# Patient Record
Sex: MALE | Race: WHITE | Employment: OTHER | URBAN - METROPOLITAN AREA
[De-identification: names, ages, dates, MRNs, and addresses within clinical notes are randomized per-mention and may not be internally consistent; named-entity substitution may affect disease eponyms.]

---

## 2018-01-09 PROBLEM — M25.562 ACUTE PAIN OF LEFT KNEE: Status: ACTIVE | Noted: 2018-01-09

## 2019-05-01 ENCOUNTER — HOSPITAL ENCOUNTER (EMERGENCY)
Age: 69
Discharge: HOME | End: 2019-05-01
Payer: MEDICARE

## 2019-05-01 ENCOUNTER — APPOINTMENT (OUTPATIENT)
Dept: ULTRASOUND IMAGING | Age: 69
End: 2019-05-01
Payer: MEDICARE

## 2019-05-01 ENCOUNTER — APPOINTMENT (OUTPATIENT)
Dept: GENERAL RADIOLOGY | Age: 69
End: 2019-05-01
Payer: MEDICARE

## 2019-05-01 VITALS
TEMPERATURE: 98.9 F | HEART RATE: 58 BPM | OXYGEN SATURATION: 97 % | RESPIRATION RATE: 18 BRPM | WEIGHT: 205 LBS | BODY MASS INDEX: 28.7 KG/M2 | DIASTOLIC BLOOD PRESSURE: 69 MMHG | HEIGHT: 71 IN | SYSTOLIC BLOOD PRESSURE: 156 MMHG

## 2019-05-01 DIAGNOSIS — M79.604 RIGHT LEG PAIN: ICD-10-CM

## 2019-05-01 DIAGNOSIS — M25.561 ACUTE PAIN OF RIGHT KNEE: Primary | ICD-10-CM

## 2019-05-01 PROCEDURE — 93971 EXTREMITY STUDY: CPT

## 2019-05-01 PROCEDURE — 73564 X-RAY EXAM KNEE 4 OR MORE: CPT

## 2019-05-01 PROCEDURE — 6370000000 HC RX 637 (ALT 250 FOR IP): Performed by: PHYSICIAN ASSISTANT

## 2019-05-01 PROCEDURE — 99283 EMERGENCY DEPT VISIT LOW MDM: CPT

## 2019-05-01 RX ORDER — HYDROCODONE BITARTRATE AND ACETAMINOPHEN 5; 325 MG/1; MG/1
1 TABLET ORAL ONCE
Status: COMPLETED | OUTPATIENT
Start: 2019-05-01 | End: 2019-05-01

## 2019-05-01 RX ORDER — HYDROCODONE BITARTRATE AND ACETAMINOPHEN 5; 325 MG/1; MG/1
1 TABLET ORAL EVERY 6 HOURS PRN
Qty: 12 TABLET | Refills: 0 | Status: SHIPPED | OUTPATIENT
Start: 2019-05-01 | End: 2019-05-04

## 2019-05-01 RX ADMIN — HYDROCODONE BITARTRATE AND ACETAMINOPHEN 1 TABLET: 5; 325 TABLET ORAL at 17:57

## 2019-05-01 ASSESSMENT — PAIN SCALES - GENERAL
PAINLEVEL_OUTOF10: 10
PAINLEVEL_OUTOF10: 6

## 2019-05-01 NOTE — ED PROVIDER NOTES
Independent MLP    HPI:  5/1/19,   Time: 5:09 PM         Matt Ann is a 70 y.o. male presenting to the ED for right knee and leg pain, beginning a couple of weeks ago. The complaint has been constant, moderate in severity, and worsened by movement of knee, walking. She states he's having some right knee pain for the last couple weeks. It feels like an ache. The pain is mostly to the lateral side B also has it on the lateral side of the leg and upper thigh. He said has some tenderness behind the knee. He has an appointment with Dr. Vikki Cruz on Tuesday. Today while getting out of his car he hyperextended the leg and now having worsening pain unable to bear weight. Patient works and is having difficulty doing his job as he does do a lot of lifting  Because of the pain. Denies any head injury or neck pain. ROS:   Pertinent positives and negatives are stated within HPI, all other systems reviewed and are negative.  --------------------------------------------- PAST HISTORY ---------------------------------------------  Past Medical History:  has a past medical history of Hoarseness. Past Surgical History:  has a past surgical history that includes Appendectomy and laryngoscopy (10/18/2012). Social History:  reports that he has been smoking cigars. He has never used smokeless tobacco. He reports that he drinks alcohol. He reports that he does not use drugs. Family History: family history is not on file. The patients home medications have been reviewed. Allergies: Patient has no known allergies. -------------------------------------------------- RESULTS -------------------------------------------------  All laboratory and radiology results have been personally reviewed by myself   LABS:  No results found for this visit on 05/01/19. RADIOLOGY:  Interpreted by Radiologist.  XR KNEE RIGHT (MIN 4 VIEWS)   Final Result   Mild osteoarthritis as described.       US DUP LOWER EXTREMITY RIGHT SARAH   Final Result   PATENT DEEP VENOUS SYSTEM OF THE RIGHT LOWER EXTREMITY. NO EVIDENCE FOR DVT.          ------------------------- NURSING NOTES AND VITALS REVIEWED ---------------------------   The nursing notes within the ED encounter and vital signs as below have been reviewed. BP (!) 156/69   Pulse 58   Temp 98.9 °F (37.2 °C) (Oral)   Resp 18   Ht 5' 11\" (1.803 m)   Wt 205 lb (93 kg)   SpO2 97%   BMI 28.59 kg/m²   Oxygen Saturation Interpretation: Normal      ---------------------------------------------------PHYSICAL EXAM--------------------------------------      Constitutional/General: Alert and oriented x3, well appearing, non toxic in NAD  Head: NC/AT  Eyes: PERRL, EOMI  Mouth: Oropharynx clear, handling secretions, no trismus  Neck: Supple, full ROM, no meningeal signs  Pulmonary: Lungs clear to auscultation bilaterally, no wheezes, rales, or rhonchi. Not in respiratory distress  Cardiovascular:  Regular rate and rhythm, no murmurs, gallops, or rubs. 2+ distal pulses  Abdomen: Soft, non tender, non distended,   Extremities: Moves all extremities x 4. Warm and well perfused. Tenderness palpation of the right lateral leg, posterior knee and right lateral thigh. Negative edema, ecchymosis or erythema. Distal pulses are intact. Range of motion the right knee is intact but painful. Negative ligament laxity is appreciated On the right knee  Skin: warm and dry without rash  Neurologic: GCS 15,  Psych: Normal Affect      ------------------------------ ED COURSE/MEDICAL DECISION MAKING----------------------  Medications - No data to display      Medical Decision Making:    Patient was seen independently. Results reviewed with the patient. I did offer patient an immobilizer he has refused that he does have crutches in the car. Has appointment with his orthopedic doctor on Tuesday. Once I discussed   For any worsening symptoms. Counseling:    The emergency provider has spoken with the patient and discussed todays results, in addition to providing specific details for the plan of care and counseling regarding the diagnosis and prognosis. Questions are answered at this time and they are agreeable with the plan.      --------------------------------- IMPRESSION AND DISPOSITION ---------------------------------    IMPRESSION  1. Acute pain of right knee New Problem   2.  Right leg pain New Problem       DISPOSITION  Disposition: Discharge to home  Patient condition is stable                 Yue Mcgee [place]  05/01/19 6433

## 2019-05-07 ENCOUNTER — OFFICE VISIT (OUTPATIENT)
Dept: ORTHOPEDIC SURGERY | Age: 69
End: 2019-05-07
Payer: MEDICARE

## 2019-05-07 VITALS
HEART RATE: 48 BPM | HEIGHT: 70 IN | BODY MASS INDEX: 29.35 KG/M2 | WEIGHT: 205 LBS | SYSTOLIC BLOOD PRESSURE: 156 MMHG | TEMPERATURE: 98.2 F | DIASTOLIC BLOOD PRESSURE: 62 MMHG

## 2019-05-07 DIAGNOSIS — G89.29 CHRONIC PAIN OF RIGHT KNEE: Primary | ICD-10-CM

## 2019-05-07 DIAGNOSIS — M25.561 CHRONIC PAIN OF RIGHT KNEE: Primary | ICD-10-CM

## 2019-05-07 DIAGNOSIS — M17.12 OSTEOARTHRITIS OF LEFT KNEE, UNSPECIFIED OSTEOARTHRITIS TYPE: ICD-10-CM

## 2019-05-07 PROCEDURE — G8419 CALC BMI OUT NRM PARAM NOF/U: HCPCS | Performed by: ORTHOPAEDIC SURGERY

## 2019-05-07 PROCEDURE — 4004F PT TOBACCO SCREEN RCVD TLK: CPT | Performed by: ORTHOPAEDIC SURGERY

## 2019-05-07 PROCEDURE — 20610 DRAIN/INJ JOINT/BURSA W/O US: CPT | Performed by: ORTHOPAEDIC SURGERY

## 2019-05-07 PROCEDURE — 1123F ACP DISCUSS/DSCN MKR DOCD: CPT | Performed by: ORTHOPAEDIC SURGERY

## 2019-05-07 PROCEDURE — G8427 DOCREV CUR MEDS BY ELIG CLIN: HCPCS | Performed by: ORTHOPAEDIC SURGERY

## 2019-05-07 PROCEDURE — 3017F COLORECTAL CA SCREEN DOC REV: CPT | Performed by: ORTHOPAEDIC SURGERY

## 2019-05-07 PROCEDURE — 4040F PNEUMOC VAC/ADMIN/RCVD: CPT | Performed by: ORTHOPAEDIC SURGERY

## 2019-05-07 PROCEDURE — 99213 OFFICE O/P EST LOW 20 MIN: CPT | Performed by: ORTHOPAEDIC SURGERY

## 2019-05-07 RX ORDER — TRIAMCINOLONE ACETONIDE 40 MG/ML
80 INJECTION, SUSPENSION INTRA-ARTICULAR; INTRAMUSCULAR ONCE
Status: COMPLETED | OUTPATIENT
Start: 2019-05-07 | End: 2019-05-07

## 2019-05-07 RX ORDER — LIDOCAINE HYDROCHLORIDE 10 MG/ML
3 INJECTION, SOLUTION INFILTRATION; PERINEURAL ONCE
Status: COMPLETED | OUTPATIENT
Start: 2019-05-07 | End: 2019-05-07

## 2019-05-07 RX ADMIN — LIDOCAINE HYDROCHLORIDE 3 ML: 10 INJECTION, SOLUTION INFILTRATION; PERINEURAL at 15:43

## 2019-05-07 RX ADMIN — TRIAMCINOLONE ACETONIDE 80 MG: 40 INJECTION, SUSPENSION INTRA-ARTICULAR; INTRAMUSCULAR at 15:44

## 2019-05-08 NOTE — PROGRESS NOTES
Chief Complaint:   Chief Complaint   Patient presents with    Knee Pain     Right \"throbbing\" knee pain x 6 - 8 wks. This past wednesday (5/1/19) he hyper-extended right knee, he is having diffuculty putting pressure on it. Pt. is using one crutch, especially on stairs. Pt. went to [facility] ED and xrays were taken. Linus Mehta  presents with a 6-8 week history of throbbing pain mostly medial aspect of the right knee occurring with activities involving weightbearing standing and flexion. No history of injury, although last week he did have what sounds like a ground-level hyperextension mechanism in pain became acutely worse at that time, interfering more significantly with weightbearing activities. No mechanical locking giving way, denies previous problems with this knee, he does have a history of similar issues with the left knee for which I saw him a year ago, the symptoms are mild at this point and had responded to conservative means. Taking over-the-counter's with minimal relief, no fever chills sweats other systemic symptoms nor other joint complaints. Allergies; medications; past medical, surgical, family, and social history; and problem list have been reviewed today and updated as indicated in this encounter seen below. Exam: Upper extremities intact leg lengths equal hip motion painless. Left knee tender to palpation medial joint line normal range of motion no effusion or laxity. Right knee shows some synovitis no effusion, exquisite tenderness to palpation around the posterior medial joint line but negative Michael's. No mediolateral or AP laxity. Range of motion 0 to 1:30 bilaterally. Radiographs: Weightbearing x-rays of the knees were obtained today, there is some medial narrowing bilaterally with mild varus deformities early subchondral sclerosis and osteophyte formation consistent with early to moderate DJD bilateral knees. Ger Staples was seen today for knee pain.     Diagnoses and all orders for this visit:    Chronic pain of right knee  -     TX ARTHROCENTESIS ASPIR&/INJ MAJOR JT/BURSA W/O US    Osteoarthritis of left knee, unspecified osteoarthritis type  -     XR Knee Bilateral Standing  -     Cancel: XR KNEE LEFT (1-2 VIEWS)  -     XR KNEE RIGHT (1-2 VIEWS)    Other orders  -     lidocaine 1 % injection 3 mL  -     triamcinolone acetonide (KENALOG-40) injection 80 mg       Treatment options were reviewed, nonoperative treatment is advised at this point. Patient was reminded to pursue the physical therapy exercises as he learned on the left, at his request I injected the right knee today with Kenalog and lidocaine no difficulty, or complication tolerated well. Questions asked and answered follow-up in 6 weeks repeat exam further discussion. Return in about 6 weeks (around 6/18/2019). Current Outpatient Medications   Medication Sig Dispense Refill    amLODIPine (NORVASC) 5 MG tablet Take 5 mg by mouth daily      ibuprofen (ADVIL;MOTRIN) 200 MG tablet Take 600 mg by mouth every morning. No current facility-administered medications for this visit.         Patient Active Problem List   Diagnosis    Acute pain of left knee       Past Medical History:   Diagnosis Date    Hoarseness     began 8/2012; no pain       Past Surgical History:   Procedure Laterality Date    APPENDECTOMY      LARYNGOSCOPY  10/18/2012       No Known Allergies    Social History     Socioeconomic History    Marital status:      Spouse name: None    Number of children: None    Years of education: None    Highest education level: None   Occupational History    None   Social Needs    Financial resource strain: None    Food insecurity:     Worry: None     Inability: None    Transportation needs:     Medical: None     Non-medical: None   Tobacco Use    Smoking status: Current Every Day Smoker     Types: Cigars    Smokeless tobacco: Never Used    Tobacco comment: 3 to 4 cigars daily   Substance

## 2019-06-13 ENCOUNTER — OFFICE VISIT (OUTPATIENT)
Dept: ORTHOPEDIC SURGERY | Age: 69
End: 2019-06-13
Payer: MEDICARE

## 2019-06-13 VITALS
DIASTOLIC BLOOD PRESSURE: 62 MMHG | TEMPERATURE: 98.5 F | HEIGHT: 70 IN | WEIGHT: 205 LBS | HEART RATE: 53 BPM | BODY MASS INDEX: 29.35 KG/M2 | SYSTOLIC BLOOD PRESSURE: 157 MMHG

## 2019-06-13 DIAGNOSIS — M48.062 SPINAL STENOSIS OF LUMBAR REGION WITH NEUROGENIC CLAUDICATION: Primary | ICD-10-CM

## 2019-06-13 DIAGNOSIS — M25.561 CHRONIC PAIN OF RIGHT KNEE: ICD-10-CM

## 2019-06-13 DIAGNOSIS — G89.29 CHRONIC PAIN OF RIGHT KNEE: ICD-10-CM

## 2019-06-13 PROCEDURE — G8427 DOCREV CUR MEDS BY ELIG CLIN: HCPCS | Performed by: ORTHOPAEDIC SURGERY

## 2019-06-13 PROCEDURE — 3017F COLORECTAL CA SCREEN DOC REV: CPT | Performed by: ORTHOPAEDIC SURGERY

## 2019-06-13 PROCEDURE — 1123F ACP DISCUSS/DSCN MKR DOCD: CPT | Performed by: ORTHOPAEDIC SURGERY

## 2019-06-13 PROCEDURE — 99213 OFFICE O/P EST LOW 20 MIN: CPT | Performed by: ORTHOPAEDIC SURGERY

## 2019-06-13 PROCEDURE — 4004F PT TOBACCO SCREEN RCVD TLK: CPT | Performed by: ORTHOPAEDIC SURGERY

## 2019-06-13 PROCEDURE — G8419 CALC BMI OUT NRM PARAM NOF/U: HCPCS | Performed by: ORTHOPAEDIC SURGERY

## 2019-06-13 PROCEDURE — 4040F PNEUMOC VAC/ADMIN/RCVD: CPT | Performed by: ORTHOPAEDIC SURGERY

## 2019-06-13 NOTE — PROGRESS NOTES
Chief Complaint:   Chief Complaint   Patient presents with    Knee Pain     Pt. states steroid injection really helped R knee pain, today has slight medial R knee pain 1/10. Still has cramping back of R thigh and calf area. Maira Lopez reports dramatic and persistent relief of his focal right knee pain following the steroid injection although he does note persistence of the radicular type aching and cramping in the posterior aspect of the thigh and calf that actually predated his knee pain. Taking nothing in these regards for pain, symptoms in the back of the leg are interfering with his activities including transfers ambulation and stair climbing. No numbness tingling in the foot, no loss of bowel or bladder control and no other joint complaints. 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 Allergies; medications; past medical, surgical, family, and social history; and problem list have been reviewed today and updated as indicated in this encounter seen below. Exam: Leg lengths equal hip motion painless, no objective motor or sensory deficits. Right knee shows no laxity deformity or effusion no crepitus with full range of motion. Straight leg raising notable only for some tension in the hamstrings today. Radiographs: Deferred today previous radiographs showing early DJD of the right knee. X-rays of spine deferred. Alfonso Regan was seen today for knee pain.     Diagnoses and all orders for this visit:    Spinal stenosis of lumbar region with neurogenic claudication  -     Rudolph Lackey DO, Physical Medicine and Rehabilitation, [place]    Chronic pain of right knee       Treatment alternatives for the knee and his otherwise symptoms suggestive of lumbar origin were reviewed, I recommended and placed referral for physiatry to evaluate and possibly treat in that regard. Questions asked and answered follow-up here as needed. Return if symptoms worsen or fail to improve. Current Outpatient Medications   Medication Sig Dispense Refill    amLODIPine (NORVASC) 5 MG tablet Take 5 mg by mouth daily      ibuprofen (ADVIL;MOTRIN) 200 MG tablet Take 600 mg by mouth 2 times daily        No current facility-administered medications for this visit. Patient Active Problem List   Diagnosis    Acute pain of left knee       Past Medical History:   Diagnosis Date    Hoarseness     began 8/2012; no pain       Past Surgical History:   Procedure Laterality Date    APPENDECTOMY      LARYNGOSCOPY  10/18/2012       No Known Allergies    Social History     Socioeconomic History    Marital status:      Spouse name: None    Number of children: None    Years of education: None    Highest education level: None   Occupational History    None   Social Needs    Financial resource strain: None    Food insecurity:     Worry: None     Inability: None    Transportation needs:     Medical: None     Non-medical: None   Tobacco Use    Smoking status: Current Every Day Smoker     Types: Cigars    Smokeless tobacco: Never Used    Tobacco comment: 3 to 4 cigars daily   Substance and Sexual Activity    Alcohol use:  Yes     Alcohol/week: 0.0 oz     Comment: 2-3 glasses wine daily    Drug use: No    Sexual activity: None   Lifestyle    Physical activity:     Days per week: None     Minutes per session: None    Stress: None   Relationships    Social connections:     Talks on phone: None     Gets together: None     Attends Sabianism service: None     Active member of club or organization: None     Attends meetings of clubs or organizations: None     Relationship status: None    Intimate partner violence:     Fear of current or ex partner: None     Emotionally abused: None     Physically abused: None     Forced sexual activity: None   Other Topics Concern    None   Social History Narrative    None       History reviewed. No pertinent family history. Review of Systems  As follows except as previously noted in HPI:  Constitutional: Negative for chills, diaphoresis, fatigue, fever and unexpected weight change. Respiratory: Negative for cough, shortness of breath and wheezing. Cardiovascular: Negative for chest pain and palpitations. Neurological: Negative for dizziness, syncope, cephalgia. GI / : negative  Musculoskeletal: see HPI       Objective:   Physical Exam   Constitutional: Oriented to person, place, and time. and appears well-developed and well-nourished. :   Head: Normocephalic and atraumatic. Eyes: EOM are normal.   Neck: Neck supple. Cardiovascular: Normal rate and regular rhythm. Pulmonary/Chest: Effort normal. No stridor. No respiratory distress, no wheezes. Abdominal:  No abnormal distension. Neurological: Alert and oriented to person, place, and time. Skin: Skin is warm and dry. Psychiatric: Normal mood and affect.  Behavior is normal. Thought content normal.    6/13/2019  4:10 PM

## 2019-07-03 ENCOUNTER — OFFICE VISIT (OUTPATIENT)
Dept: PHYSICAL MEDICINE AND REHAB | Age: 69
End: 2019-07-03
Payer: MEDICARE

## 2019-07-03 VITALS
HEIGHT: 70 IN | DIASTOLIC BLOOD PRESSURE: 74 MMHG | HEART RATE: 61 BPM | WEIGHT: 202 LBS | SYSTOLIC BLOOD PRESSURE: 140 MMHG | BODY MASS INDEX: 28.92 KG/M2

## 2019-07-03 DIAGNOSIS — G89.29 CHRONIC RIGHT-SIDED LOW BACK PAIN WITH RIGHT-SIDED SCIATICA: ICD-10-CM

## 2019-07-03 DIAGNOSIS — M54.41 CHRONIC RIGHT-SIDED LOW BACK PAIN WITH RIGHT-SIDED SCIATICA: ICD-10-CM

## 2019-07-03 DIAGNOSIS — R25.2 CRAMPS OF RIGHT LOWER EXTREMITY: Primary | ICD-10-CM

## 2019-07-03 PROCEDURE — 4040F PNEUMOC VAC/ADMIN/RCVD: CPT | Performed by: PHYSICAL MEDICINE & REHABILITATION

## 2019-07-03 PROCEDURE — 4004F PT TOBACCO SCREEN RCVD TLK: CPT | Performed by: PHYSICAL MEDICINE & REHABILITATION

## 2019-07-03 PROCEDURE — 3017F COLORECTAL CA SCREEN DOC REV: CPT | Performed by: PHYSICAL MEDICINE & REHABILITATION

## 2019-07-03 PROCEDURE — G8427 DOCREV CUR MEDS BY ELIG CLIN: HCPCS | Performed by: PHYSICAL MEDICINE & REHABILITATION

## 2019-07-03 PROCEDURE — 99204 OFFICE O/P NEW MOD 45 MIN: CPT | Performed by: PHYSICAL MEDICINE & REHABILITATION

## 2019-07-03 PROCEDURE — G8419 CALC BMI OUT NRM PARAM NOF/U: HCPCS | Performed by: PHYSICAL MEDICINE & REHABILITATION

## 2019-07-03 PROCEDURE — 1123F ACP DISCUSS/DSCN MKR DOCD: CPT | Performed by: PHYSICAL MEDICINE & REHABILITATION

## 2019-07-03 NOTE — PROGRESS NOTES
Charlie Murray, [address] [facility] Physical Medicine and Rehabilitation  [address] [place]  Phone: [phone]  Fax: [phone]    PCP: Tamir Mancia MD  Date of visit: 7/3/19    Chief Complaint   Patient presents with    Lower Back Pain     New Patient    Leg Pain     Right leg       Dear Dr. Newton Loredo,     Thank you for referring your patient to be seen. As you know,  Daljit Jain is a 70 y.o. male with past medical history as below who presents with right leg cramping and low back pain for years. There was a gradual onset of pain after no known injury recently but did have a back injury over 40 years ago after falling down steps. Now, the pain is constant and occurs daily. The pain is rated Pain Score:   4, is described as achy, and is located in the low back. It does not radiate but he reports constant cramping in his right hamstring and calf. The symptoms have been worse since onset. The pain is better with ibuprofen. The pain is worse with while sleeping. There is no associated numbness/tingling. There is no weakness. There is no bowel/bladder changes. The prior workup has included: none     The prior treatment has included:  PT: none for low back   Chiropractic: none    Modalities: none  OTC Tylenol: yes with relief   NSAIDS: ibuprofen with relief of back pain  Opioids: none    Membrane stabilizers: none    Muscle relaxers: none   Previous injections: none    Previous surgery at this site: none    No Known Allergies    Current Outpatient Medications   Medication Sig Dispense Refill    amLODIPine (NORVASC) 5 MG tablet Take 5 mg by mouth daily      ibuprofen (ADVIL;MOTRIN) 200 MG tablet Take 600 mg by mouth 2 times daily        No current facility-administered medications for this visit.         Past Medical History:   Diagnosis Date    Hoarseness     began 8/2012; no pain       Past Surgical History:   Procedure Laterality Date   

## 2019-07-19 ENCOUNTER — TELEPHONE (OUTPATIENT)
Dept: PHYSICAL MEDICINE AND REHAB | Age: 69
End: 2019-07-19

## 2019-07-29 ENCOUNTER — OFFICE VISIT (OUTPATIENT)
Dept: PHYSICAL MEDICINE AND REHAB | Age: 69
End: 2019-07-29
Payer: MEDICARE

## 2019-07-29 VITALS — HEIGHT: 70 IN | BODY MASS INDEX: 28.63 KG/M2 | WEIGHT: 200 LBS

## 2019-07-29 DIAGNOSIS — R25.2 CRAMPS OF RIGHT LOWER EXTREMITY: ICD-10-CM

## 2019-07-29 DIAGNOSIS — M54.17 LUMBOSACRAL RADICULITIS: Primary | ICD-10-CM

## 2019-07-29 DIAGNOSIS — M54.41 CHRONIC RIGHT-SIDED LOW BACK PAIN WITH RIGHT-SIDED SCIATICA: ICD-10-CM

## 2019-07-29 DIAGNOSIS — G89.29 CHRONIC RIGHT-SIDED LOW BACK PAIN WITH RIGHT-SIDED SCIATICA: ICD-10-CM

## 2019-07-29 PROCEDURE — 95886 MUSC TEST DONE W/N TEST COMP: CPT | Performed by: PHYSICAL MEDICINE & REHABILITATION

## 2019-07-29 PROCEDURE — 95909 NRV CNDJ TST 5-6 STUDIES: CPT | Performed by: PHYSICAL MEDICINE & REHABILITATION

## 2019-08-06 ENCOUNTER — PREP FOR PROCEDURE (OUTPATIENT)
Dept: PHYSICAL MEDICINE AND REHAB | Age: 69
End: 2019-08-06

## 2019-08-08 ENCOUNTER — HOSPITAL ENCOUNTER (OUTPATIENT)
Dept: OPERATING ROOM | Age: 69
Setting detail: OUTPATIENT SURGERY
Discharge: HOME | End: 2019-08-08
Attending: PHYSICAL MEDICINE & REHABILITATION
Payer: MEDICARE

## 2019-08-08 ENCOUNTER — HOSPITAL ENCOUNTER (OUTPATIENT)
Age: 69
Setting detail: OUTPATIENT SURGERY
Discharge: HOME | End: 2019-08-08
Attending: PHYSICAL MEDICINE & REHABILITATION | Admitting: PHYSICAL MEDICINE & REHABILITATION
Payer: MEDICARE

## 2019-08-08 VITALS
OXYGEN SATURATION: 98 % | RESPIRATION RATE: 16 BRPM | SYSTOLIC BLOOD PRESSURE: 161 MMHG | HEART RATE: 48 BPM | DIASTOLIC BLOOD PRESSURE: 71 MMHG

## 2019-08-08 DIAGNOSIS — M51.9 LUMBAR DISC DISEASE: ICD-10-CM

## 2019-08-08 PROBLEM — M54.17 LUMBOSACRAL RADICULITIS: Status: ACTIVE | Noted: 2019-08-08

## 2019-08-08 PROCEDURE — 7100000010 HC PHASE II RECOVERY - FIRST 15 MIN: Performed by: PHYSICAL MEDICINE & REHABILITATION

## 2019-08-08 PROCEDURE — 2709999900 HC NON-CHARGEABLE SUPPLY: Performed by: PHYSICAL MEDICINE & REHABILITATION

## 2019-08-08 PROCEDURE — 6360000002 HC RX W HCPCS: Performed by: PHYSICAL MEDICINE & REHABILITATION

## 2019-08-08 PROCEDURE — 7100000011 HC PHASE II RECOVERY - ADDTL 15 MIN: Performed by: PHYSICAL MEDICINE & REHABILITATION

## 2019-08-08 PROCEDURE — 2580000003 HC RX 258: Performed by: PHYSICAL MEDICINE & REHABILITATION

## 2019-08-08 PROCEDURE — 2500000003 HC RX 250 WO HCPCS: Performed by: PHYSICAL MEDICINE & REHABILITATION

## 2019-08-08 PROCEDURE — 6360000004 HC RX CONTRAST MEDICATION: Performed by: PHYSICAL MEDICINE & REHABILITATION

## 2019-08-08 PROCEDURE — 3209999900 FLUORO FOR SURGICAL PROCEDURES

## 2019-08-08 PROCEDURE — 64483 NJX AA&/STRD TFRM EPI L/S 1: CPT | Performed by: PHYSICAL MEDICINE & REHABILITATION

## 2019-08-08 PROCEDURE — 3600000005 HC SURGERY LEVEL 5 BASE: Performed by: PHYSICAL MEDICINE & REHABILITATION

## 2019-08-08 RX ORDER — LIDOCAINE HYDROCHLORIDE 10 MG/ML
INJECTION, SOLUTION EPIDURAL; INFILTRATION; INTRACAUDAL; PERINEURAL PRN
Status: DISCONTINUED | OUTPATIENT
Start: 2019-08-08 | End: 2019-08-08 | Stop reason: ALTCHOICE

## 2019-08-08 ASSESSMENT — PAIN - FUNCTIONAL ASSESSMENT
PAIN_FUNCTIONAL_ASSESSMENT: 0-10
PAIN_FUNCTIONAL_ASSESSMENT: PREVENTS OR INTERFERES SOME ACTIVE ACTIVITIES AND ADLS

## 2019-08-08 ASSESSMENT — PAIN DESCRIPTION - DESCRIPTORS: DESCRIPTORS: ACHING

## 2019-08-08 ASSESSMENT — PAIN SCALES - GENERAL: PAINLEVEL_OUTOF10: 0

## 2020-04-24 ENCOUNTER — TELEPHONE (OUTPATIENT)
Dept: ADMINISTRATIVE | Age: 70
End: 2020-04-24

## 2020-05-26 ENCOUNTER — OFFICE VISIT (OUTPATIENT)
Dept: PHYSICAL MEDICINE AND REHAB | Age: 70
End: 2020-05-26
Payer: MEDICARE

## 2020-05-26 ENCOUNTER — TELEPHONE (OUTPATIENT)
Dept: PHYSICAL MEDICINE AND REHAB | Age: 70
End: 2020-05-26

## 2020-05-26 VITALS
HEART RATE: 56 BPM | SYSTOLIC BLOOD PRESSURE: 134 MMHG | HEIGHT: 70 IN | DIASTOLIC BLOOD PRESSURE: 70 MMHG | BODY MASS INDEX: 28.2 KG/M2 | WEIGHT: 197 LBS

## 2020-05-26 PROCEDURE — G8427 DOCREV CUR MEDS BY ELIG CLIN: HCPCS | Performed by: PHYSICAL MEDICINE & REHABILITATION

## 2020-05-26 PROCEDURE — 1123F ACP DISCUSS/DSCN MKR DOCD: CPT | Performed by: PHYSICAL MEDICINE & REHABILITATION

## 2020-05-26 PROCEDURE — 4004F PT TOBACCO SCREEN RCVD TLK: CPT | Performed by: PHYSICAL MEDICINE & REHABILITATION

## 2020-05-26 PROCEDURE — 4040F PNEUMOC VAC/ADMIN/RCVD: CPT | Performed by: PHYSICAL MEDICINE & REHABILITATION

## 2020-05-26 PROCEDURE — G8417 CALC BMI ABV UP PARAM F/U: HCPCS | Performed by: PHYSICAL MEDICINE & REHABILITATION

## 2020-05-26 PROCEDURE — 99214 OFFICE O/P EST MOD 30 MIN: CPT | Performed by: PHYSICAL MEDICINE & REHABILITATION

## 2020-05-26 PROCEDURE — 3017F COLORECTAL CA SCREEN DOC REV: CPT | Performed by: PHYSICAL MEDICINE & REHABILITATION

## 2020-05-26 NOTE — PROGRESS NOTES
Gaetano Sánchez, [address] [facility] Physical Medicine and Rehabilitation  [address] [facility] [facility] [place]  Phone: [phone]  Fax: [phone]    PCP: Gee Sandhu MD  Date of visit: 5/26/20    Chief Complaint   Patient presents with    Lower Back Pain     Follow up     Interval:   Patient presents today for follow up visit regarding low back and leg pain. He had right S1 TFESI in August and reports this helped greatly with the posterior leg pain. He now reports low back pain that radiates into legs when standing or walking. The pain goes away when he sits down. The pain is rated Pain Score:   3, is described as achy. The pain is better with ibuprofen. The pain is worse with while sleeping. There is no associated numbness/tingling. There is no weakness. There is no bowel/bladder changes. The prior workup has included: EMG, MRI      The prior treatment has included:  PT: none for low back   Chiropractic: none    Modalities: none  OTC Tylenol: yes with relief   NSAIDS: ibuprofen with relief of back pain  Opioids: none    Membrane stabilizers: none    Muscle relaxers: none   Previous injections: right S1 TFESI    Previous surgery at this site: none    No Known Allergies    Current Outpatient Medications   Medication Sig Dispense Refill    amLODIPine (NORVASC) 5 MG tablet Take 5 mg by mouth daily      ibuprofen (ADVIL;MOTRIN) 200 MG tablet Take 600 mg by mouth 2 times daily        No current facility-administered medications for this visit. Past Medical History:   Diagnosis Date    Hoarseness     began 8/2012; no pain       Past Surgical History:   Procedure Laterality Date    ANESTHESIA NERVE BLOCK Right 8/8/2019    RIGHT S1 TRANSFORAMINAL EPIDURAL STEROID INJECTION performed by Gaetano Sánchez DO at [address] [facility]  10/18/2012    NERVE BLOCK Right 08/08/2019    right S1 transforaminal epidural        History reviewed.  No revealed tenderness along lumbosacral paraspinals, ttp midline spine, no ttp SI joint sulcus, greater trochanters and TFL on bilateral side. There was no paraspinal spasms. There were no trigger points. No ttp right hamstring or calf  ROM: ROM revealed flexion normal no pain but pain with returning to neutral, extension normal,   Special/provocative testing:   Supine SLR negative   negative FABERS. Neurological Exam:  Strength:   R  L  Hip Flex  5  5  Knee Ext  5  5  Ankle dorsi  5  5  EHL   5 5  Ankle Plantar  5  5    Sensory:  Intact for light touch in all lower extremity dermatomes. Reflexes:   R  L  Patellar  (1+) (1+)  Ankle Jerk  (0) (0)    Gait is wide based. MRI L Spine   EMG     Impression:   Tang Guo is a 78 y.o. male     1. Lumbar stenosis with neurogenic claudication        Plan:   · Patient would benefit from L4-5 ILESI. Procedure risks, benefits and alternatives were discussed. Patient would like to proceed. · Did well with S1 TFESI     The patient was educated about the diagnosis, prognosis, indications, risks and benefits of treatment. An opportunity to ask questions was given to the patient and questions were answered. The patient agreed to proceed with the recommended treatment as described above. Follow up after injection     Bard Alisa DO, FAAPMR   Board Certified Physical Medicine and Rehabilitation      The patient was counseled at length about the risks of cassandra Covid-19 during their perioperative period and any recovery window from their procedure. The patient was made aware that cassandra Covid-19  may worsen their prognosis for recovering from their procedure  and lend to a higher morbidity and/or mortality risk. All material risks, benefits, and reasonable alternatives including postponing the procedure were discussed. The patient does wish to proceed with the procedure at this time.

## 2020-05-26 NOTE — H&P
revealed tenderness along lumbosacral paraspinals, ttp midline spine, no ttp SI joint sulcus, greater trochanters and TFL on bilateral side. There was no paraspinal spasms. There were no trigger points. No ttp right hamstring or calf  ROM: ROM revealed flexion normal no pain but pain with returning to neutral, extension normal,   Special/provocative testing:   Supine SLR negative   negative FABERS. Neurological Exam:  Strength:   R  L  Hip Flex  5  5  Knee Ext  5  5  Ankle dorsi  5  5  EHL   5 5  Ankle Plantar  5  5    Sensory:  Intact for light touch in all lower extremity dermatomes. Reflexes:   R  L  Patellar  (1+) (1+)  Ankle Jerk  (0) (0)    Gait is wide based. MRI L Spine   EMG     Impression:   Eric Conley is a 78 y.o. male     1. Lumbar stenosis with neurogenic claudication        Plan:   · Patient would benefit from L4-5 ILESI. Procedure risks, benefits and alternatives were discussed. Patient would like to proceed. · Did well with S1 TFESI     The patient was educated about the diagnosis, prognosis, indications, risks and benefits of treatment. An opportunity to ask questions was given to the patient and questions were answered. The patient agreed to proceed with the recommended treatment as described above. Follow up after injection     Kelly Patel DO, FAAPMR   Board Certified Physical Medicine and Rehabilitation      The patient was counseled at length about the risks of cassandra Covid-19 during their perioperative period and any recovery window from their procedure. The patient was made aware that cassandra Covid-19  may worsen their prognosis for recovering from their procedure  and lend to a higher morbidity and/or mortality risk. All material risks, benefits, and reasonable alternatives including postponing the procedure were discussed. The patient does wish to proceed with the procedure at this time.

## 2020-06-17 ENCOUNTER — TELEPHONE (OUTPATIENT)
Dept: PHYSICAL MEDICINE AND REHAB | Age: 70
End: 2020-06-17

## 2020-06-17 NOTE — TELEPHONE ENCOUNTER
Called patient to follow up on scheduling his epidural injection. On the last conversation, pt stated he had to see the dentist for possible abcess in tooth. He had an appointment scheduled with the dentist on 06/08/2020. I called him to see where we stand on scheduling the epidural. Left message for patient to call the office to give us an update.

## 2020-06-25 ENCOUNTER — HOSPITAL ENCOUNTER (OUTPATIENT)
Age: 70
Discharge: HOME | End: 2020-06-27
Payer: MEDICARE

## 2020-06-25 PROCEDURE — U0003 INFECTIOUS AGENT DETECTION BY NUCLEIC ACID (DNA OR RNA); SEVERE ACUTE RESPIRATORY SYNDROME CORONAVIRUS 2 (SARS-COV-2) (CORONAVIRUS DISEASE [COVID-19]), AMPLIFIED PROBE TECHNIQUE, MAKING USE OF HIGH THROUGHPUT TECHNOLOGIES AS DESCRIBED BY CMS-2020-01-R: HCPCS

## 2020-06-26 LAB
SARS-COV-2: NOT DETECTED
SOURCE: NORMAL

## 2020-07-02 ENCOUNTER — HOSPITAL ENCOUNTER (OUTPATIENT)
Dept: OPERATING ROOM | Age: 70
Discharge: HOME | End: 2020-07-02
Payer: MEDICARE

## 2020-07-02 ENCOUNTER — HOSPITAL ENCOUNTER (OUTPATIENT)
Age: 70
Setting detail: OUTPATIENT SURGERY
Discharge: HOME | End: 2020-07-02
Attending: PHYSICAL MEDICINE & REHABILITATION | Admitting: PHYSICAL MEDICINE & REHABILITATION
Payer: MEDICARE

## 2020-07-02 VITALS
DIASTOLIC BLOOD PRESSURE: 54 MMHG | TEMPERATURE: 97.9 F | OXYGEN SATURATION: 100 % | RESPIRATION RATE: 16 BRPM | SYSTOLIC BLOOD PRESSURE: 155 MMHG | HEART RATE: 48 BPM

## 2020-07-02 PROBLEM — M48.062 SPINAL STENOSIS OF LUMBAR REGION WITH NEUROGENIC CLAUDICATION: Status: ACTIVE | Noted: 2020-07-02

## 2020-07-02 PROCEDURE — 7100000010 HC PHASE II RECOVERY - FIRST 15 MIN: Performed by: PHYSICAL MEDICINE & REHABILITATION

## 2020-07-02 PROCEDURE — 2709999900 HC NON-CHARGEABLE SUPPLY: Performed by: PHYSICAL MEDICINE & REHABILITATION

## 2020-07-02 PROCEDURE — 6360000004 HC RX CONTRAST MEDICATION: Performed by: PHYSICAL MEDICINE & REHABILITATION

## 2020-07-02 PROCEDURE — 62323 NJX INTERLAMINAR LMBR/SAC: CPT | Performed by: PHYSICAL MEDICINE & REHABILITATION

## 2020-07-02 PROCEDURE — 3600000005 HC SURGERY LEVEL 5 BASE: Performed by: PHYSICAL MEDICINE & REHABILITATION

## 2020-07-02 PROCEDURE — 7100000011 HC PHASE II RECOVERY - ADDTL 15 MIN: Performed by: PHYSICAL MEDICINE & REHABILITATION

## 2020-07-02 PROCEDURE — 2580000003 HC RX 258: Performed by: PHYSICAL MEDICINE & REHABILITATION

## 2020-07-02 PROCEDURE — 3209999900 FLUORO FOR SURGICAL PROCEDURES

## 2020-07-02 PROCEDURE — 2500000003 HC RX 250 WO HCPCS: Performed by: PHYSICAL MEDICINE & REHABILITATION

## 2020-07-02 PROCEDURE — 6360000002 HC RX W HCPCS: Performed by: PHYSICAL MEDICINE & REHABILITATION

## 2020-07-02 RX ORDER — LIDOCAINE HYDROCHLORIDE 10 MG/ML
INJECTION, SOLUTION EPIDURAL; INFILTRATION; INTRACAUDAL; PERINEURAL PRN
Status: DISCONTINUED | OUTPATIENT
Start: 2020-07-02 | End: 2020-07-02 | Stop reason: ALTCHOICE

## 2020-07-02 ASSESSMENT — PAIN - FUNCTIONAL ASSESSMENT: PAIN_FUNCTIONAL_ASSESSMENT: 0-10

## 2020-07-02 ASSESSMENT — PAIN SCALES - GENERAL
PAINLEVEL_OUTOF10: 0
PAINLEVEL_OUTOF10: 0

## 2020-07-02 ASSESSMENT — PAIN DESCRIPTION - DESCRIPTORS: DESCRIPTORS: DISCOMFORT

## 2020-07-02 NOTE — H&P
Lady Lares, [address] [facility] Physical Medicine and Rehabilitation  [address] [facility] [facility] [place]  Phone: [phone]  Fax: [phone]    PCP: Cathie Paredes MD  Date of visit: 7/2/2020    CC: low back pain       Lucien Aldrich is a 78 y.o. male who presents today for epidural steroid injection. Patient complains of low back pain. No red flag symptoms. Consents to proceed with procedure. No Known Allergies    No current facility-administered medications for this encounter. Past Medical History:   Diagnosis Date    Hoarseness     began 8/2012; no pain       Past Surgical History:   Procedure Laterality Date    ANESTHESIA NERVE BLOCK Right 8/8/2019    RIGHT S1 TRANSFORAMINAL EPIDURAL STEROID INJECTION performed by Lady Luda DO at [address]  10/18/2012    NERVE BLOCK Right 08/08/2019    right S1 transforaminal epidural        No family history on file. ROS:    Constitutional: Denies fevers, chills, night sweats, unintentional weight loss     Skin: Denies rash or skin changes     Respiratory: Denies SOB or cough     Cardiovascular: Denies CP, palpitations, edema      Neurologic: See HPI.     MSK: See HPI. Hematologic/Lymphatic/Immunologic: Denies bruising       Physical Exam:   Blood pressure (!) 161/50, pulse (!) 43, temperature 97.9 °F (36.6 °C), temperature source Skin, resp. rate 16, SpO2 99 %. General: well developed and well nourished in no acute distress  Resp: symmetrical chest expansion, unlabored breathing, respirations unlabored. CV: Heart rate is regular. Peripheral pulses are palpable  Skin: No rashes or ecchymosis. Normal turgor. MSK: ttp lumbar psps     Neurological Exam:  No focal sensorimotor deficits.        Impression:     Lumbar stenosis      Plan:   · Injection as planned today           Lady Luda DO, [facility]   Board Certified Physical Medicine and Rehabilitation

## 2020-07-02 NOTE — OP NOTE
EPIDURAL STEROID INJECTION, INTERLAMINAR APPROACH      WITH FLUOROSCOPIC GUIDANCE      Patient: Lebron Bennett              MRN#: [id number]  : 1950            Date of procedure: 2020      Physician Performing Procedure:  Servando Chapa DO    Clinical Scenario: As per electronic documentation. Diagnosis:   1. Neurogenic claudication due to lumbar spinal stenosis        Injectate: A total of 5 cc; consisting of 1cc of Dexamethasone (10mg/cc), with the remainder of normal saline. Levels Treated: L4-5    Approach:  Interlaminar      Comments:  None     Pre-procedural evaluation: The patient was examined today just prior to performing the procedure listed above. The patient's heart rate was normal and lungs were clear to auscultation bilaterally. Procedure: The patient was prepped and draped in a sterile fashion in the prone position after informed consent was signed and all the patient's questions were answered including the risks, benefits, alternative treatment options, and prognosis. The risks include - but are not limited to - infection, allergic reaction, increased pain, lack of therapeutic benefit, steroid reaction, nerve damage, paralysis, stroke, epidural hematoma, syncope, headache, respiratory or cardiac arrest, pneumothorax, and scar formation. Using a (paramedian approach from the side mentioned above/midline approach), the region overlying the inferior lamina was localized under fluoroscopic visualization and the soft tissues overlying this structure were infiltrated with 4 cc. of 1% buffered Lidocaine without Epinephrine. A 18 gauge, 3.5 inch Tuohy needle was inserted into the epidural space using the approach mentioned above. The epidural space was localized using loss of resistance after negative aspirate for air, blood, and CSF.   A 2 cc. volume of Isoview was injected into the epidural space and the flow of contrast was observed to be epidural.  Radiographs were obtained for documentation purposes. The Injectate was administered into the level noted above. The patient tolerated the procedure well and was discharged after an appropriate period of observation. If there are any complications, the patient was instructed to call us. The patient is to follow-up with the requesting physician after the injection, preferably within two weeks.

## 2020-11-16 ENCOUNTER — OFFICE VISIT (OUTPATIENT)
Dept: PHYSICAL MEDICINE AND REHAB | Age: 70
End: 2020-11-16
Payer: MEDICARE

## 2020-11-16 VITALS
HEART RATE: 54 BPM | BODY MASS INDEX: 28.2 KG/M2 | HEIGHT: 70 IN | WEIGHT: 197 LBS | SYSTOLIC BLOOD PRESSURE: 167 MMHG | DIASTOLIC BLOOD PRESSURE: 74 MMHG

## 2020-11-16 PROCEDURE — G8417 CALC BMI ABV UP PARAM F/U: HCPCS | Performed by: PHYSICAL MEDICINE & REHABILITATION

## 2020-11-16 PROCEDURE — 3017F COLORECTAL CA SCREEN DOC REV: CPT | Performed by: PHYSICAL MEDICINE & REHABILITATION

## 2020-11-16 PROCEDURE — 99213 OFFICE O/P EST LOW 20 MIN: CPT | Performed by: PHYSICAL MEDICINE & REHABILITATION

## 2020-11-16 PROCEDURE — 4004F PT TOBACCO SCREEN RCVD TLK: CPT | Performed by: PHYSICAL MEDICINE & REHABILITATION

## 2020-11-16 PROCEDURE — 1123F ACP DISCUSS/DSCN MKR DOCD: CPT | Performed by: PHYSICAL MEDICINE & REHABILITATION

## 2020-11-16 PROCEDURE — G8427 DOCREV CUR MEDS BY ELIG CLIN: HCPCS | Performed by: PHYSICAL MEDICINE & REHABILITATION

## 2020-11-16 PROCEDURE — 4040F PNEUMOC VAC/ADMIN/RCVD: CPT | Performed by: PHYSICAL MEDICINE & REHABILITATION

## 2020-11-16 PROCEDURE — G8484 FLU IMMUNIZE NO ADMIN: HCPCS | Performed by: PHYSICAL MEDICINE & REHABILITATION

## 2020-11-16 NOTE — H&P
Iwona, [address] [facility]  [address] [facility] [place]  Phone: [phone]  Fax: [phone]    PCP: Isha Douglas MD  Date of visit: 11/16/20    Chief Complaint   Patient presents with    Back Pain     lower     Interval:   Patient presents today for follow up visit. He underwent L4-5 ILESI in July without any relief. He presents today with right sided low back and leg pain. Similar to pain he had prior to doing right S1 MARSHAL. The pain is rated Pain Score:   3, is described as achy. The pain is better with ibuprofen. The pain is worse with while sleeping. There is no associated numbness/tingling. There is no weakness. There is no bowel/bladder changes. The prior workup has included: EMG, MRI      The prior treatment has included:  PT: doing HEP   Chiropractic: none    Modalities: none  OTC Tylenol: yes with relief   NSAIDS: ibuprofen with relief of back pain  Opioids: none    Membrane stabilizers: none    Muscle relaxers: none   Previous injections: right S1 TFESI, L4-5 ILESI    Previous surgery at this site: none    No Known Allergies    Current Outpatient Medications   Medication Sig Dispense Refill    amLODIPine (NORVASC) 5 MG tablet Take 5 mg by mouth daily      ibuprofen (ADVIL;MOTRIN) 200 MG tablet Take 600 mg by mouth 2 times daily        No current facility-administered medications for this visit.         Past Medical History:   Diagnosis Date    Hoarseness     began 8/2012; no pain       Past Surgical History:   Procedure Laterality Date    ANESTHESIA NERVE BLOCK Right 8/8/2019    RIGHT S1 TRANSFORAMINAL EPIDURAL STEROID INJECTION performed by DO Iwona at [facility]  10/18/2012    NERVE BLOCK Right 08/08/2019    right S1 transforaminal epidural     NERVE BLOCK  07/02/2020    interlaminar epidural    PAIN MANAGEMENT PROCEDURE N/A 7/2/2020    L4-5 INTERLAMINAR EPIDURAL STEROID INJECTION performed by Woodrow Coreas DO at [address] [place] [address]       No family history on file. Social History     Tobacco Use    Smoking status: Current Every Day Smoker     Types: Cigars    Smokeless tobacco: Never Used    Tobacco comment: 3 to 4 cigars daily   Substance Use Topics    Alcohol use: Yes     Alcohol/week: 0.0 standard drinks     Comment: 2-3 glasses wine daily    Drug use: No          Functional Status: The patient is able to ambulate and perform activities of daily living without the use of an assistive device. ROS:   Constitutional: Denies fevers, chills, night sweats, unintentional weight loss     Skin: Denies rash or skin changes     Eyes: Denies vision changes    Ears/Nose/Throat: Denies nasal congestion or sore throat     Respiratory: Denies SOB or cough     Cardiovascular: Denies CP, palpitations, edema      Gastrointestinal: Denies abdominal pain,  N/V, constipation, or diarrhea    Genitourinary: Denies urinary symptoms    Neurologic: See HPI.     MSK: See HPI. Psychiatric: Denies sleep disturbance, anxiety, depression    Hematologic/Lymphatic/Immunologic: Denies bruising       Physical Exam:   Blood pressure (!) 167/74, pulse 54, height 5' 10\" (1.778 m), weight 197 lb (89.4 kg). General: well developed and well nourished in no acute distress. Body habitus is non obese  HEENT: No rhinorrhea, sneezing, yawning, or lacrimation. No scleral icterus or conjunctival injection. Resp: symmetrical chest expansion, unlabored breathing, respirations unlabored. CV: Heart rate is regular. Peripheral pulses are palpable  Lymph: No visible regional lymphadenopathy. Skin: No rashes or ecchymosis. Normal turgor. Psych: Mood is calm. Affect is normal.   Ext: No edema noted. Left leg and ankle discoloration, spider veins    MSK:   Back/Hip Exam:   Inspection: Pelvis was asymmetric. Right side higher. Fullness to right gluteus muscles.  Lumbar lordotic curvature was decreased. There was no scoliosis. No ecchymoses or erythema. Palpation: Palpatory exam revealed tenderness along lumbosacral paraspinals, no ttp midline spine, no ttp SI joint sulcus, greater trochanters and TFL on bilateral side. There was no paraspinal spasms. There were no trigger points. No ttp right hamstring or calf  ROM: ROM revealed flexion normal no pain but pain with returning to neutral, extension normal,   Special/provocative testing:   Supine SLR negative   negative FABERS. Neurological Exam:  Strength:   R  L  Hip Flex  5  5  Knee Ext  5  5  Ankle dorsi  5  5  EHL   5 5  Ankle Plantar  5  5    Sensory:  Intact for light touch in all lower extremity dermatomes. Reflexes:   R  L  Patellar  (1+) (1+)  Ankle Jerk  (0) (0)    Gait is wide based. MRI L Spine   EMG     Impression:   Jr Garza is a 78 y.o. male     1. Lumbosacral radiculitis        Plan:   · Patient would benefit from right S1 TFESI. Procedure risks, benefits and alternatives were discussed. Patient would like to proceed.  The patient was educated about the diagnosis, prognosis, indications, risks and benefits of treatment. An opportunity to ask questions was given to the patient and questions were answered. The patient agreed to proceed with the recommended treatment as described above. Follow up after injection     Woodrow Coreas DO, FAAPMR   Board Certified Physical Medicine and Rehabilitation      The patient was counseled at length about the risks of cassandra Covid-19 during their perioperative period and any recovery window from their procedure. The patient was made aware that cassandra Covid-19  may worsen their prognosis for recovering from their procedure  and lend to a higher morbidity and/or mortality risk. All material risks, benefits, and reasonable alternatives including postponing the procedure were discussed.  The patient does wish to proceed with the procedure at this time.

## 2020-11-16 NOTE — PROGRESS NOTES
Ana Priest, [address] [facility] Physical Medicine and Rehabilitation  [address] [facility] [place]  Phone: [phone]  Fax: [phone]    PCP: Frederick Zamora MD  Date of visit: 11/16/20    Chief Complaint   Patient presents with    Back Pain     lower     Interval:   Patient presents today for follow up visit. He underwent L4-5 ILESI in July without any relief. He presents today with right sided low back and leg pain. Similar to pain he had prior to doing right S1 MARSHAL. The pain is rated Pain Score:   3, is described as achy. The pain is better with ibuprofen. The pain is worse with while sleeping. There is no associated numbness/tingling. There is no weakness. There is no bowel/bladder changes. The prior workup has included: EMG, MRI      The prior treatment has included:  PT: doing HEP   Chiropractic: none    Modalities: none  OTC Tylenol: yes with relief   NSAIDS: ibuprofen with relief of back pain  Opioids: none    Membrane stabilizers: none    Muscle relaxers: none   Previous injections: right S1 TFESI, L4-5 ILESI    Previous surgery at this site: none    No Known Allergies    Current Outpatient Medications   Medication Sig Dispense Refill    amLODIPine (NORVASC) 5 MG tablet Take 5 mg by mouth daily      ibuprofen (ADVIL;MOTRIN) 200 MG tablet Take 600 mg by mouth 2 times daily        No current facility-administered medications for this visit.         Past Medical History:   Diagnosis Date    Hoarseness     began 8/2012; no pain       Past Surgical History:   Procedure Laterality Date    ANESTHESIA NERVE BLOCK Right 8/8/2019    RIGHT S1 TRANSFORAMINAL EPIDURAL STEROID INJECTION performed by Ana Priest DO at [facility]  10/18/2012    NERVE BLOCK Right 08/08/2019    right S1 transforaminal epidural     NERVE BLOCK  07/02/2020    interlaminar epidural    PAIN MANAGEMENT PROCEDURE N/A 7/2/2020    L4-5 lordotic curvature was decreased. There was no scoliosis. No ecchymoses or erythema. Palpation: Palpatory exam revealed tenderness along lumbosacral paraspinals, no ttp midline spine, no ttp SI joint sulcus, greater trochanters and TFL on bilateral side. There was no paraspinal spasms. There were no trigger points. No ttp right hamstring or calf  ROM: ROM revealed flexion normal no pain but pain with returning to neutral, extension normal,   Special/provocative testing:   Supine SLR negative   negative FABERS. Neurological Exam:  Strength:   R  L  Hip Flex  5  5  Knee Ext  5  5  Ankle dorsi  5  5  EHL   5 5  Ankle Plantar  5  5    Sensory:  Intact for light touch in all lower extremity dermatomes. Reflexes:   R  L  Patellar  (1+) (1+)  Ankle Jerk  (0) (0)    Gait is wide based. MRI L Spine   EMG     Impression:   Austin Godinez is a 78 y.o. male     1. Lumbosacral radiculitis        Plan:   · Patient would benefit from right S1 TFESI. Procedure risks, benefits and alternatives were discussed. Patient would like to proceed.  The patient was educated about the diagnosis, prognosis, indications, risks and benefits of treatment. An opportunity to ask questions was given to the patient and questions were answered. The patient agreed to proceed with the recommended treatment as described above. Follow up after injection     Jacobo Quinn DO, FAAPMR   Board Certified Physical Medicine and Rehabilitation      The patient was counseled at length about the risks of cassandra Covid-19 during their perioperative period and any recovery window from their procedure. The patient was made aware that cassandra Covid-19  may worsen their prognosis for recovering from their procedure  and lend to a higher morbidity and/or mortality risk. All material risks, benefits, and reasonable alternatives including postponing the procedure were discussed.  The patient does wish to proceed with the procedure at this time.

## 2020-11-18 ENCOUNTER — HOSPITAL ENCOUNTER (OUTPATIENT)
Age: 70
Discharge: HOME | End: 2020-11-20
Payer: MEDICARE

## 2020-11-18 PROCEDURE — U0003 INFECTIOUS AGENT DETECTION BY NUCLEIC ACID (DNA OR RNA); SEVERE ACUTE RESPIRATORY SYNDROME CORONAVIRUS 2 (SARS-COV-2) (CORONAVIRUS DISEASE [COVID-19]), AMPLIFIED PROBE TECHNIQUE, MAKING USE OF HIGH THROUGHPUT TECHNOLOGIES AS DESCRIBED BY CMS-2020-01-R: HCPCS

## 2020-11-19 LAB
SARS-COV-2: NOT DETECTED
SOURCE: NORMAL

## 2020-11-23 ENCOUNTER — HOSPITAL ENCOUNTER (OUTPATIENT)
Age: 70
Setting detail: OUTPATIENT SURGERY
Discharge: HOME | End: 2020-11-23
Attending: PHYSICAL MEDICINE & REHABILITATION | Admitting: PHYSICAL MEDICINE & REHABILITATION
Payer: MEDICARE

## 2020-11-23 ENCOUNTER — HOSPITAL ENCOUNTER (OUTPATIENT)
Dept: OPERATING ROOM | Age: 70
Setting detail: OUTPATIENT SURGERY
Discharge: HOME | End: 2020-11-23
Attending: PHYSICAL MEDICINE & REHABILITATION
Payer: MEDICARE

## 2020-11-23 VITALS
SYSTOLIC BLOOD PRESSURE: 106 MMHG | HEART RATE: 56 BPM | BODY MASS INDEX: 28.2 KG/M2 | OXYGEN SATURATION: 98 % | RESPIRATION RATE: 16 BRPM | DIASTOLIC BLOOD PRESSURE: 55 MMHG | TEMPERATURE: 97.5 F | HEIGHT: 70 IN | WEIGHT: 197 LBS

## 2020-11-23 PROCEDURE — 7100000011 HC PHASE II RECOVERY - ADDTL 15 MIN: Performed by: PHYSICAL MEDICINE & REHABILITATION

## 2020-11-23 PROCEDURE — 2580000003 HC RX 258: Performed by: PHYSICAL MEDICINE & REHABILITATION

## 2020-11-23 PROCEDURE — 3209999900 FLUORO FOR SURGICAL PROCEDURES

## 2020-11-23 PROCEDURE — 64483 NJX AA&/STRD TFRM EPI L/S 1: CPT | Performed by: PHYSICAL MEDICINE & REHABILITATION

## 2020-11-23 PROCEDURE — 3600000015 HC SURGERY LEVEL 5 ADDTL 15MIN: Performed by: PHYSICAL MEDICINE & REHABILITATION

## 2020-11-23 PROCEDURE — 3600000005 HC SURGERY LEVEL 5 BASE: Performed by: PHYSICAL MEDICINE & REHABILITATION

## 2020-11-23 PROCEDURE — 2709999900 HC NON-CHARGEABLE SUPPLY: Performed by: PHYSICAL MEDICINE & REHABILITATION

## 2020-11-23 PROCEDURE — 2500000003 HC RX 250 WO HCPCS: Performed by: PHYSICAL MEDICINE & REHABILITATION

## 2020-11-23 PROCEDURE — 6360000004 HC RX CONTRAST MEDICATION: Performed by: PHYSICAL MEDICINE & REHABILITATION

## 2020-11-23 PROCEDURE — 6360000002 HC RX W HCPCS: Performed by: PHYSICAL MEDICINE & REHABILITATION

## 2020-11-23 PROCEDURE — 7100000010 HC PHASE II RECOVERY - FIRST 15 MIN: Performed by: PHYSICAL MEDICINE & REHABILITATION

## 2020-11-23 RX ORDER — LIDOCAINE HYDROCHLORIDE 10 MG/ML
INJECTION, SOLUTION EPIDURAL; INFILTRATION; INTRACAUDAL; PERINEURAL PRN
Status: DISCONTINUED | OUTPATIENT
Start: 2020-11-23 | End: 2020-11-23 | Stop reason: ALTCHOICE

## 2020-11-23 ASSESSMENT — PAIN - FUNCTIONAL ASSESSMENT: PAIN_FUNCTIONAL_ASSESSMENT: 0-10

## 2020-11-23 ASSESSMENT — PAIN DESCRIPTION - PAIN TYPE: TYPE: CHRONIC PAIN

## 2020-11-23 ASSESSMENT — PAIN SCALES - GENERAL: PAINLEVEL_OUTOF10: 1

## 2020-11-23 ASSESSMENT — PAIN DESCRIPTION - LOCATION: LOCATION: BACK

## 2020-11-23 NOTE — H&P
Marquez Agrawal, [address] [facility] Physical Medicine and Rehabilitation  [facility]. [facility] [place]  Phone: [phone]  Fax: [phone]     PCP: Gi Lin MD  Date of visit: 11/16/20          Chief Complaint   Patient presents with    Back Pain       lower      Interval:   Patient presents today for follow up visit. He underwent L4-5 ILESI in July without any relief. He presents today with right sided low back and leg pain. Similar to pain he had prior to doing right S1 MARSHAL. The pain is rated Pain Score:   3, is described as achy. The pain is better with ibuprofen. The pain is worse with while sleeping. There is no associated numbness/tingling. There is no weakness.  There is no bowel/bladder changes.      The prior workup has included: EMG, MRI       The prior treatment has included:  PT: doing HEP   Chiropractic: none    Modalities: none  OTC Tylenol: yes with relief   NSAIDS: ibuprofen with relief of back pain  Opioids: none    Membrane stabilizers: none    Muscle relaxers: none   Previous injections: right S1 TFESI, L4-5 ILESI    Previous surgery at this site: none     No Known Allergies            Current Outpatient Medications   Medication Sig Dispense Refill    amLODIPine (NORVASC) 5 MG tablet Take 5 mg by mouth daily        ibuprofen (ADVIL;MOTRIN) 200 MG tablet Take 600 mg by mouth 2 times daily           No current facility-administered medications for this visit.               Past Medical History:   Diagnosis Date    Hoarseness       began 8/2012; no pain               Past Surgical History:   Procedure Laterality Date    ANESTHESIA NERVE BLOCK Right 8/8/2019     RIGHT S1 TRANSFORAMINAL EPIDURAL STEROID INJECTION performed by Marquez Agrawal DO at [place] [address]   10/18/2012    NERVE BLOCK Right 08/08/2019     right S1 transforaminal epidural     NERVE BLOCK   07/02/2020     interlaminar epidural    PAIN MANAGEMENT PROCEDURE N/A 7/2/2020     L4-5 INTERLAMINAR EPIDURAL STEROID INJECTION performed by Laine Russell DO at [address] [place] [address]         No family history on file.     Social History            Tobacco Use    Smoking status: Current Every Day Smoker       Types: Cigars    Smokeless tobacco: Never Used    Tobacco comment: 3 to 4 cigars daily   Substance Use Topics    Alcohol use: Yes       Alcohol/week: 0.0 standard drinks       Comment: 2-3 glasses wine daily    Drug use: No            Functional Status: The patient is able to ambulate and perform activities of daily living without the use of an assistive device. ROS:   Constitutional: Denies fevers, chills, night sweats, unintentional weight loss     Skin: Denies rash or skin changes     Eyes: Denies vision changes    Ears/Nose/Throat: Denies nasal congestion or sore throat     Respiratory: Denies SOB or cough     Cardiovascular: Denies CP, palpitations, edema      Gastrointestinal: Denies abdominal pain,  N/V, constipation, or diarrhea    Genitourinary: Denies urinary symptoms    Neurologic: See HPI.     MSK: See HPI. Psychiatric: Denies sleep disturbance, anxiety, depression    Hematologic/Lymphatic/Immunologic: Denies bruising         Physical Exam:   Blood pressure (!) 167/74, pulse 54, height 5' 10\" (1.778 m), weight 197 lb (89.4 kg). General: well developed and well nourished in no acute distress. Body habitus is non obese  HEENT: No rhinorrhea, sneezing, yawning, or lacrimation. No scleral icterus or conjunctival injection. Resp: symmetrical chest expansion, unlabored breathing, respirations unlabored. CV: Heart rate is regular. Peripheral pulses are palpable  Lymph: No visible regional lymphadenopathy. Skin: No rashes or ecchymosis. Normal turgor. Psych: Mood is calm. Affect is normal.   Ext: No edema noted.  Left leg and ankle discoloration, spider veins     MSK:   Back/Hip Exam:   Inspection: Pelvis was asymmetric. Right side higher. Fullness to right gluteus muscles. Lumbar lordotic curvature was decreased. There was no scoliosis. No ecchymoses or erythema. Palpation: Palpatory exam revealed tenderness along lumbosacral paraspinals, no ttp midline spine, no ttp SI joint sulcus, greater trochanters and TFL on bilateral side. There was no paraspinal spasms. There were no trigger points. No ttp right hamstring or calf  ROM: ROM revealed flexion normal no pain but pain with returning to neutral, extension normal,   Special/provocative testing:   Supine SLR negative   negative FABERS.       Neurological Exam:  Strength:                     R          L  Hip Flex                       5          5  Knee Ext                     5          5  Ankle dorsi                  5          5  EHL                             5          5  Ankle Plantar               5          5     Sensory:  Intact for light touch in all lower extremity dermatomes.      Reflexes:                     R          L  Patellar                        (1+)      (1+)  Ankle Jerk                   (0)        (0)     Gait is wide based.     MRI L Spine   EMG      Impression:   Curt Bolanos is a 78 y.o. male      1. Lumbosacral radiculitis          Plan:   · Patient would benefit from right S1 TFESI. Procedure risks, benefits and alternatives were discussed. Patient would like to proceed.      · The patient was educated about the diagnosis, prognosis, indications, risks and benefits of treatment. An opportunity to ask questions was given to the patient and questions were answered.   The patient agreed to proceed with the recommended treatment as described above.      Follow up after injection      Bong Quiros DO, FAAPMR   Board Certified Physical Medicine and Rehabilitation        The patient was counseled at length about the risks of cassandra Covid-19 during their perioperative period and any recovery window from their procedure.  The patient was made aware that cassandra Covid-19  may worsen their prognosis for recovering from their procedure  and lend to a higher morbidity and/or mortality risk.  All material risks, benefits, and reasonable alternatives including postponing the procedure were discussed.  The patient does wish to proceed with the procedure at this time.

## 2020-11-23 NOTE — OP NOTE
LUMBOSACRAL EPIDURAL STEROID INJECTION, TRANSFORAMINAL APPROACH       WITH FLUOROSCOPIC GUIDANCE    Patient: Javi Bonilla                         MRN#: [id number]  : 1950   Date of procedure: 2020      Physician Performing Procedure:  Dottie Apodaca DO    Clinical Scenario: As per electronic documentation. Diagnosis: lumbosacral radiculitis    Injectate: A total of 3cc, consisting of 1 cc of Dexamethasone 10mg/ml,  with the remainder of normal saline    Levels Treated:  Right S1 neural foramen    Approach:   Transforaminal    Improvement after today's procedure: As per nursing record. Comments:  None     Pre-procedural evaluation: The patient was examined today just prior to performing the procedure listed above. The patient's heart rate was normal, lungs were clear to auscultation bilaterally. Procedure: The patient was prepped and draped in a sterile fashion in the prone position after informed consent was signed and all the patient's questions were answered including the risks, benefits, alternative treatment options, and prognosis. The risks include - but are not limited to - infection, allergic reaction, increased pain, lack of therapeutic benefit, steroid reaction, nerve damage, paralysis, stroke, epidural hematoma, syncope, headache, respiratory or cardiac arrest, and scar formation. The C-arm was positioned so that an oblique view of the neural foramen as noted above was visualized. The soft tissues overlying this structure were infiltrated with 2-3 cc. of 1% Lidocaine without Epinephrine. A 22 gauge 3.5 inch spinal needle was inserted toward the target using a trajectory view along the fluoroscope beam.  Under AP and lateral visualization, the needle was advanced so it did not puncture dura. Biplanar projections were used to confirm position. Aspiration was confirmed to be negative for CSF and/or blood.   A 1-2 cc. volume of Isovue contrast was injected at this level.  The contrast was observed to flow under the pedicle. Radiographs were obtained for documentation purposes. After attaining flow of contrast documented above, the above injectate was administered into the transforaminal epidural space. The patient tolerated the procedure well and was discharged after an appropriate period of observation. If there are any complications, the patient was instructed to call us. The patient is to follow-up with the requesting physician after the injection, preferably within three weeks.

## 2021-09-08 ENCOUNTER — OFFICE VISIT (OUTPATIENT)
Dept: CARDIOLOGY CLINIC | Age: 71
End: 2021-09-08
Payer: MEDICARE

## 2021-09-08 VITALS
SYSTOLIC BLOOD PRESSURE: 134 MMHG | BODY MASS INDEX: 26.2 KG/M2 | DIASTOLIC BLOOD PRESSURE: 62 MMHG | HEIGHT: 70 IN | OXYGEN SATURATION: 100 % | HEART RATE: 44 BPM | RESPIRATION RATE: 18 BRPM | WEIGHT: 183 LBS

## 2021-09-08 DIAGNOSIS — I35.0 AORTIC VALVE STENOSIS, ETIOLOGY OF CARDIAC VALVE DISEASE UNSPECIFIED: Primary | ICD-10-CM

## 2021-09-08 DIAGNOSIS — I10 ESSENTIAL HYPERTENSION: ICD-10-CM

## 2021-09-08 DIAGNOSIS — R00.1 SINUS BRADYCARDIA: ICD-10-CM

## 2021-09-08 PROBLEM — M25.562 ACUTE PAIN OF LEFT KNEE: Status: RESOLVED | Noted: 2018-01-09 | Resolved: 2021-09-08

## 2021-09-08 PROCEDURE — 4040F PNEUMOC VAC/ADMIN/RCVD: CPT | Performed by: INTERNAL MEDICINE

## 2021-09-08 PROCEDURE — 93000 ELECTROCARDIOGRAM COMPLETE: CPT | Performed by: INTERNAL MEDICINE

## 2021-09-08 PROCEDURE — G8417 CALC BMI ABV UP PARAM F/U: HCPCS | Performed by: INTERNAL MEDICINE

## 2021-09-08 PROCEDURE — 1123F ACP DISCUSS/DSCN MKR DOCD: CPT | Performed by: INTERNAL MEDICINE

## 2021-09-08 PROCEDURE — 99204 OFFICE O/P NEW MOD 45 MIN: CPT | Performed by: INTERNAL MEDICINE

## 2021-09-08 PROCEDURE — 3017F COLORECTAL CA SCREEN DOC REV: CPT | Performed by: INTERNAL MEDICINE

## 2021-09-08 PROCEDURE — G8427 DOCREV CUR MEDS BY ELIG CLIN: HCPCS | Performed by: INTERNAL MEDICINE

## 2021-09-08 PROCEDURE — 4004F PT TOBACCO SCREEN RCVD TLK: CPT | Performed by: INTERNAL MEDICINE

## 2021-09-08 NOTE — PROGRESS NOTES
Chief Complaint   Patient presents with    Cardiac Valve Problem       Patient Active Problem List    Diagnosis Date Noted    Aortic stenosis 09/08/2021     Overview Note:     A. Echo 8/24/21 (Jg): Vmax = 3.1, MG = 20, COURTNEY = 1.0, EF 69%      Essential hypertension 09/08/2021    Sinus bradycardia 09/08/2021    Spinal stenosis of lumbar region with neurogenic claudication 07/02/2020    Lumbosacral radiculitis 08/08/2019       Current Outpatient Medications   Medication Sig Dispense Refill    amLODIPine (NORVASC) 5 MG tablet Take 5 mg by mouth daily      ibuprofen (ADVIL;MOTRIN) 200 MG tablet Take 600 mg by mouth 2 times daily        No current facility-administered medications for this visit. No Known Allergies    Vitals:    09/08/21 1124   BP: 134/62   Site: Right Upper Arm   Position: Sitting   Cuff Size: Large Adult   Pulse: (!) 44   Resp: 18   SpO2: 100%   Weight: 183 lb (83 kg)   Height: 5' 10\" (1.778 m)       Social History     Socioeconomic History    Marital status:      Spouse name: Not on file    Number of children: Not on file    Years of education: Not on file    Highest education level: Not on file   Occupational History    Not on file   Tobacco Use    Smoking status: Current Every Day Smoker     Types: Cigars    Smokeless tobacco: Never Used    Tobacco comment: 3 to 4 cigars daily   Vaping Use    Vaping Use: Never assessed   Substance and Sexual Activity    Alcohol use: Yes     Alcohol/week: 0.0 standard drinks     Comment: 2-3 glasses wine daily.   1 pot of coffee a day     Drug use: No    Sexual activity: Not on file   Other Topics Concern    Not on file   Social History Narrative    Not on file     Social Determinants of Health     Financial Resource Strain:     Difficulty of Paying Living Expenses:    Food Insecurity:     Worried About Running Out of Food in the Last Year:     [address] in the Last Year:    Transportation Needs:     Lack of Transportation (Medical):  Lack of Transportation (Non-Medical):    Physical Activity:     Days of Exercise per Week:     Minutes of Exercise per Session:    Stress:     Feeling of Stress :    Social Connections:     Frequency of Communication with Friends and Family:     Frequency of Social Gatherings with Friends and Family:     Attends Yarsanism Services:     Active Member of Clubs or Organizations:     Attends Club or Organization Meetings:     Marital Status:    Intimate Partner Violence:     Fear of Current or Ex-Partner:     Emotionally Abused:     Physically Abused:     Sexually Abused:        Family History   Problem Relation Age of Onset    Cancer Mother     Heart Attack Father     Cancer Sister          SUBJECTIVE: Alice Spears presents to the office today for consult - dr Prua Gilman - for cardiac evaluation. Has known about heart murmur for some time, and has had slow heart beat \"all my life\"  Had echo at [facility] - I reviewed report. 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 Isa Rock He complains of no new or unstable cardiac symptoms - limited by back and knee pains and denies   chest pain, chest pressure/discomfort, claudication, dyspnea, exertional chest pressure/discomfort, irregular heart beat, lower extremity edema, near-syncope, orthopnea, palpitations, paroxysmal nocturnal dyspnea, syncope and tachypnea. Review of Systems:   Heart: as above   Lungs: as above   Eyes: denies changes in vision or discharge. Ears: denies changes in hearing or pain. Nose: denies epistaxis or masses   Throat: denies sore throat or trouble swallowing. Neuro: denies numbness, tingling, tremors. Skin: denies rashes or itching. : denies hematuria, dysuria   GI: denies vomiting, diarrhea   Psych: denies mood changed, anxiety, depression. all others negative.     Physical Exam   /62 (Site: Right Upper Arm, Position: Sitting, Cuff Size: Large Adult)   Pulse (!) 44 Resp 18   Ht 5' 10\" (1.778 m)   Wt 183 lb (83 kg)   SpO2 100%   BMI 26.26 kg/m²   Constitutional: Oriented to person, place, and time. Well-developed and well-nourished. No distress. Head: Normocephalic and atraumatic. Eyes: EOM are normal. Pupils are equal, round, and reactive to light. Neck: Normal range of motion. Neck supple. No hepatojugular reflux and no JVD present. Carotid bruit is not present and has normal upstroke and amplitude   Cardiovascular: Normal rate, regular rhythm, normal heart sounds and intact distal pulses. Exam reveals no gallop and no friction rub. Grade III/VI late peaking AS murmur heard. S2 diminished but still audible. Pulmonary/Chest: Effort normal and breath sounds normal. No respiratory distress. No wheezes. No rales. Abdominal: Soft. Bowel sounds are normal. No distension and no mass. No tenderness. No rebound and no guarding. Musculoskeletal: Normal range of motion. No edema and no tenderness. Neurological: Alert and oriented to person, place, and time. Skin: Skin is warm and dry. No rash noted. Not diaphoretic. No erythema. Psychiatric: Normal mood and affect. Behavior is normal.     EKG:  sinus bradycardia, rate 44, axis +34, otherwise normal .    ASSESSMENT AND PLAN:  Patient Active Problem List   Diagnosis    Lumbosacral radiculitis    Spinal stenosis of lumbar region with neurogenic claudication    Aortic stenosis    Essential hypertension    Sinus bradycardia     Patient with aortic stenosis, moderate to severe, asymptomatic, outside echo claims trileaflet valve  Also marked sinus bradycardia on no negative chronotropic meds  Review of both conditions with patient including symptoms referable to same, and therapies such as TAVR and PCK  No indication at this point of intervention  Will follow          The patient was educated as to the symptoms that would require a prompt return to our office. Return visit in 6 months.     Yordy Camargo M.D  [facility]

## 2022-05-04 ENCOUNTER — OFFICE VISIT (OUTPATIENT)
Dept: CARDIOLOGY CLINIC | Age: 72
End: 2022-05-04
Payer: MEDICARE

## 2022-05-04 VITALS
HEIGHT: 70 IN | RESPIRATION RATE: 14 BRPM | DIASTOLIC BLOOD PRESSURE: 82 MMHG | BODY MASS INDEX: 26.63 KG/M2 | HEART RATE: 47 BPM | SYSTOLIC BLOOD PRESSURE: 160 MMHG | OXYGEN SATURATION: 96 % | WEIGHT: 186 LBS

## 2022-05-04 DIAGNOSIS — R00.1 SINUS BRADYCARDIA: ICD-10-CM

## 2022-05-04 DIAGNOSIS — I35.0 AORTIC VALVE STENOSIS, ETIOLOGY OF CARDIAC VALVE DISEASE UNSPECIFIED: Primary | ICD-10-CM

## 2022-05-04 PROCEDURE — 93000 ELECTROCARDIOGRAM COMPLETE: CPT | Performed by: INTERNAL MEDICINE

## 2022-05-04 PROCEDURE — G8427 DOCREV CUR MEDS BY ELIG CLIN: HCPCS | Performed by: INTERNAL MEDICINE

## 2022-05-04 PROCEDURE — 1123F ACP DISCUSS/DSCN MKR DOCD: CPT | Performed by: INTERNAL MEDICINE

## 2022-05-04 PROCEDURE — 4040F PNEUMOC VAC/ADMIN/RCVD: CPT | Performed by: INTERNAL MEDICINE

## 2022-05-04 PROCEDURE — G8417 CALC BMI ABV UP PARAM F/U: HCPCS | Performed by: INTERNAL MEDICINE

## 2022-05-04 PROCEDURE — 3017F COLORECTAL CA SCREEN DOC REV: CPT | Performed by: INTERNAL MEDICINE

## 2022-05-04 PROCEDURE — 4004F PT TOBACCO SCREEN RCVD TLK: CPT | Performed by: INTERNAL MEDICINE

## 2022-05-04 PROCEDURE — 99214 OFFICE O/P EST MOD 30 MIN: CPT | Performed by: INTERNAL MEDICINE

## 2022-05-04 NOTE — PROGRESS NOTES
Chief Complaint   Patient presents with    Valvular Heart Disease    Bradycardia       Patient Active Problem List    Diagnosis Date Noted    Aortic stenosis 09/08/2021     Overview Note:     A. Echo 8/24/21 (Jg): Vmax = 3.1, MG = 20, COURTNEY = 1.0, EF 69%      Essential hypertension 09/08/2021    Sinus bradycardia 09/08/2021    Spinal stenosis of lumbar region with neurogenic claudication 07/02/2020    Lumbosacral radiculitis 08/08/2019       Current Outpatient Medications   Medication Sig Dispense Refill    amLODIPine (NORVASC) 5 MG tablet Take 5 mg by mouth daily      ibuprofen (ADVIL;MOTRIN) 200 MG tablet Take 600 mg by mouth 2 times daily        Current Facility-Administered Medications   Medication Dose Route Frequency Provider Last Rate Last Admin    perflutren lipid microspheres (DEFINITY) injection 1.65 mg  1.5 mL IntraVENous ONCE PRN Zari Villalpando MD            No Known Allergies    Vitals:    05/04/22 1014   BP: (!) 160/82   Pulse: (!) 47   Resp: 14   SpO2: 96%   Weight: 186 lb (84.4 kg)   Height: 5' 10\" (1.778 m)       Social History     Socioeconomic History    Marital status:      Spouse name: Not on file    Number of children: Not on file    Years of education: Not on file    Highest education level: Not on file   Occupational History    Not on file   Tobacco Use    Smoking status: Current Every Day Smoker     Types: Cigars    Smokeless tobacco: Never Used    Tobacco comment: 3 to 4 cigars daily   Vaping Use    Vaping Use: Not on file   Substance and Sexual Activity    Alcohol use: Yes     Alcohol/week: 0.0 standard drinks     Comment: 2-3 glasses wine daily.   1 pot of coffee a day     Drug use: No    Sexual activity: Not on file   Other Topics Concern    Not on file   Social History Narrative    Not on file     Social Determinants of Health     Financial Resource Strain:     Difficulty of Paying Living Expenses: Not on file   Food Insecurity:     Worried About Running Out of Food in the Last Year: Not on file    Ran Out of Food in the Last Year: Not on file   Transportation Needs:     Lack of Transportation (Medical): Not on file    Lack of Transportation (Non-Medical): Not on file   Physical Activity:     Days of Exercise per Week: Not on file    Minutes of Exercise per Session: Not on file   Stress:     Feeling of Stress : Not on file   Social Connections:     Frequency of Communication with Friends and Family: Not on file    Frequency of Social Gatherings with Friends and Family: Not on file    Attends Christianity Services: Not on file    Active Member of [address] [place] [address] [employer] or Organizations: Not on file    Attends Club or Organization Meetings: Not on file    Marital Status: Not on file   Intimate Partner Violence:     Fear of Current or Ex-Partner: Not on file    Emotionally Abused: Not on file    Physically Abused: Not on file    Sexually Abused: Not on file   Housing Stability:     Unable to Pay for Housing in the Last Year: Not on file    Number of [place] in the Last Year: Not on file    Unstable Housing in the Last Year: Not on file       Family History   Problem Relation Age of Onset    Cancer Mother     Heart Attack Father     Cancer Sister          SUBJECTIVE: Karl Montalvo presents to the office today for f/u of valve disease and bradycardia. 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 Kylee Gabriel He complains of no new or unstable cardiac symptoms - limited by back and knee pains and denies   chest pain, chest pressure/discomfort, claudication, dyspnea, exertional chest pressure/discomfort, irregular heart beat, lower extremity edema, near-syncope, orthopnea, palpitations, paroxysmal nocturnal dyspnea, syncope and tachypnea. Review of Systems:   Heart: as above   Lungs: as above   Eyes: denies changes in vision or discharge. Ears: denies changes in hearing or pain.    Nose: denies epistaxis or masses   Throat: denies sore throat or trouble swallowing. Neuro: denies numbness, tingling, tremors. Skin: denies rashes or itching. : denies hematuria, dysuria   GI: denies vomiting, diarrhea   Psych: denies mood changed, anxiety, depression. all others negative. Physical Exam   BP (!) 160/82   Pulse (!) 47   Resp 14   Ht 5' 10\" (1.778 m)   Wt 186 lb (84.4 kg)   SpO2 96%   BMI 26.69 kg/m²   Constitutional: Oriented to person, place, and time. Well-developed and well-nourished. No distress. Head: Normocephalic and atraumatic. Eyes: EOM are normal. Pupils are equal, round, and reactive to light. Neck: Normal range of motion. Neck supple. No hepatojugular reflux and no JVD present. Carotid bruit is not present and has normal upstroke and amplitude   Cardiovascular: Normal rate, regular rhythm, normal heart sounds and intact distal pulses. Exam reveals no gallop and no friction rub. Grade III/VI late peaking AS murmur heard. S2 diminished but still audible. Pulmonary/Chest: Effort normal and breath sounds normal. No respiratory distress. No wheezes. No rales. Abdominal: Soft. Bowel sounds are normal. No distension and no mass. No tenderness. No rebound and no guarding. Musculoskeletal: Normal range of motion. No edema and no tenderness. Neurological: Alert and oriented to person, place, and time. Skin: Skin is warm and dry. No rash noted. Not diaphoretic. No erythema. Psychiatric: Normal mood and affect.  Behavior is normal.     EKG:  sinus bradycardia, rate 47 otherwise normal .    ASSESSMENT AND PLAN:  Patient Active Problem List   Diagnosis    Lumbosacral radiculitis    Spinal stenosis of lumbar region with neurogenic claudication    Aortic stenosis    Essential hypertension    Sinus bradycardia     Patient with aortic stenosis, moderate to severe, asymptomatic, outside echo claims trileaflet valve  Also marked sinus bradycardia on no negative chronotropic meds  Review of both conditions with patient including symptoms referable to same, and therapies such as TAVR and PCK  No indication at this point of intervention  Will echo for a Shani Horne M.D  [facility]

## 2022-06-10 ENCOUNTER — TELEPHONE (OUTPATIENT)
Dept: CARDIOLOGY | Age: 72
End: 2022-06-10

## 2022-06-10 NOTE — TELEPHONE ENCOUNTER
Called patient re: scheduling an echo. He was ok with waiting for echo. I will call when I have dates/times to schedule.  Thank you  Electronically signed by Griselda Krishnamurthy on 6/10/2022 at 12:14 PM

## 2022-06-23 ENCOUNTER — TELEPHONE (OUTPATIENT)
Dept: CARDIOLOGY | Age: 72
End: 2022-06-23

## 2022-06-23 NOTE — TELEPHONE ENCOUNTER
Called and s/w Son. Asked that I call his Father, next Tues 6/28, as Father is at the beach.  Electronically signed by Gretta Brown on 6/23/2022 at 11:03 AM

## 2022-10-05 ENCOUNTER — HOSPITAL ENCOUNTER (OUTPATIENT)
Dept: CARDIOLOGY | Age: 72
Discharge: HOME | End: 2022-10-05
Payer: MEDICARE

## 2022-10-05 ENCOUNTER — TELEPHONE (OUTPATIENT)
Dept: CARDIOLOGY CLINIC | Age: 72
End: 2022-10-05

## 2022-10-05 DIAGNOSIS — I35.0 AORTIC VALVE STENOSIS, ETIOLOGY OF CARDIAC VALVE DISEASE UNSPECIFIED: ICD-10-CM

## 2022-10-05 LAB
LV EF: 60 %
LVEF MODALITY: NORMAL

## 2022-10-05 PROCEDURE — 93306 TTE W/DOPPLER COMPLETE: CPT

## 2022-10-05 NOTE — TELEPHONE ENCOUNTER
----- Message from Kalyani Blanco MD sent at 10/5/2022 11:44 AM EDT -----  Valve with both moderate narrowing and leak. Heart strong  No indication at this point for procedure  Ov 6 months. ... [facility] [place] office? ?  ----- Message -----  Rafaela Lazcano Incoming Cardiology Results From [facility]  Sent: 10/5/2022  11:43 AM EDT  To: Kalyani Blanco MD

## 2023-02-21 ENCOUNTER — OFFICE VISIT (OUTPATIENT)
Dept: PHYSICAL MEDICINE AND REHAB | Age: 73
End: 2023-02-21
Payer: MEDICARE

## 2023-02-21 VITALS
HEIGHT: 70 IN | WEIGHT: 194 LBS | BODY MASS INDEX: 27.77 KG/M2 | HEART RATE: 62 BPM | SYSTOLIC BLOOD PRESSURE: 140 MMHG | DIASTOLIC BLOOD PRESSURE: 60 MMHG

## 2023-02-21 DIAGNOSIS — G89.29 CHRONIC BILATERAL LOW BACK PAIN WITH BILATERAL SCIATICA: ICD-10-CM

## 2023-02-21 DIAGNOSIS — Z72.0 TOBACCO ABUSE: ICD-10-CM

## 2023-02-21 DIAGNOSIS — M54.41 CHRONIC BILATERAL LOW BACK PAIN WITH BILATERAL SCIATICA: ICD-10-CM

## 2023-02-21 DIAGNOSIS — M54.42 CHRONIC BILATERAL LOW BACK PAIN WITH BILATERAL SCIATICA: ICD-10-CM

## 2023-02-21 DIAGNOSIS — M54.17 LUMBOSACRAL RADICULITIS: Primary | ICD-10-CM

## 2023-02-21 PROCEDURE — 3077F SYST BP >= 140 MM HG: CPT | Performed by: PHYSICAL MEDICINE & REHABILITATION

## 2023-02-21 PROCEDURE — G8427 DOCREV CUR MEDS BY ELIG CLIN: HCPCS | Performed by: PHYSICAL MEDICINE & REHABILITATION

## 2023-02-21 PROCEDURE — 3017F COLORECTAL CA SCREEN DOC REV: CPT | Performed by: PHYSICAL MEDICINE & REHABILITATION

## 2023-02-21 PROCEDURE — 1123F ACP DISCUSS/DSCN MKR DOCD: CPT | Performed by: PHYSICAL MEDICINE & REHABILITATION

## 2023-02-21 PROCEDURE — G8484 FLU IMMUNIZE NO ADMIN: HCPCS | Performed by: PHYSICAL MEDICINE & REHABILITATION

## 2023-02-21 PROCEDURE — 4004F PT TOBACCO SCREEN RCVD TLK: CPT | Performed by: PHYSICAL MEDICINE & REHABILITATION

## 2023-02-21 PROCEDURE — G8417 CALC BMI ABV UP PARAM F/U: HCPCS | Performed by: PHYSICAL MEDICINE & REHABILITATION

## 2023-02-21 PROCEDURE — 99214 OFFICE O/P EST MOD 30 MIN: CPT | Performed by: PHYSICAL MEDICINE & REHABILITATION

## 2023-02-21 PROCEDURE — 3078F DIAST BP <80 MM HG: CPT | Performed by: PHYSICAL MEDICINE & REHABILITATION

## 2023-02-21 RX ORDER — SODIUM CHLORIDE 9 MG/ML
INJECTION, SOLUTION INTRAVENOUS PRN
OUTPATIENT
Start: 2023-02-21

## 2023-02-21 RX ORDER — SODIUM CHLORIDE 0.9 % (FLUSH) 0.9 %
5-40 SYRINGE (ML) INJECTION EVERY 12 HOURS SCHEDULED
OUTPATIENT
Start: 2023-02-21

## 2023-02-21 RX ORDER — SODIUM CHLORIDE 0.9 % (FLUSH) 0.9 %
5-40 SYRINGE (ML) INJECTION PRN
OUTPATIENT
Start: 2023-02-21

## 2023-02-21 NOTE — H&P
Asad Gutiérrez, [id number] [facility]  [address] [facility] [place]  Phone: [phone]  Fax: [phone]    PCP: Luzmaria Sher MD  Date of visit: 2/21/23    Chief Complaint   Patient presents with    Back Pain     LOV 11/2020     Interval:   Patient presents today for office visit today regarding low back pain. I last saw him in 2020 when he underwent right S1 TFESI. He had almost complete relief with this until recently. He complains of low back pain that radiates into the posterior legs. Described as cramping and an ache. The pain is rated Pain Score: 4. The pain is better with ibuprofen. The pain is worse with standing and walking. There is no associated numbness/tingling. There is no weakness. There is no bowel/bladder changes.      The prior workup has included: EMG, MRI      The prior treatment has included:  PT: doing HEP   Chiropractic: none    Modalities: none  OTC Tylenol: yes with relief   NSAIDS: ibuprofen with relief of back pain  Opioids: none    Membrane stabilizers: none    Muscle relaxers: none   Previous injections: right S1 TFESI, L4-5 ILESI, right S1 TFESI   Previous surgery at this site: none    No Known Allergies    Current Outpatient Medications   Medication Sig Dispense Refill    amLODIPine (NORVASC) 5 MG tablet Take 5 mg by mouth daily      ibuprofen (ADVIL;MOTRIN) 200 MG tablet Take 600 mg by mouth 2 times daily        Current Facility-Administered Medications   Medication Dose Route Frequency Provider Last Rate Last Admin    perflutren lipid microspheres (DEFINITY) injection 1.65 mg  1.5 mL IntraVENous ONCE PRN Sue Vega MD           Past Medical History:   Diagnosis Date    Hoarseness     began 8/2012; no pain    Hypertension        Past Surgical History:   Procedure Laterality Date    ANESTHESIA NERVE BLOCK Right 8/8/2019    RIGHT S1 TRANSFORAMINAL EPIDURAL STEROID INJECTION performed by Neha Pineda Angel Sat at [address] [facility]  10/18/2012    NERVE BLOCK Right 08/08/2019    right S1 transforaminal epidural     NERVE BLOCK  07/02/2020    interlaminar epidural    NERVE BLOCK Right 11/23/2020    right S1 transformainal epidural steroid injection     NERVE BLOCK Right 11/23/2020    RIGHT S1 TRANSFORAMINAL EPIDURLAL STEROID INJECTION performed by Ashby Kehr, DO at [address] [facility] N/A 7/2/2020    L4-5 INTERLAMINAR EPIDURAL STEROID INJECTION performed by Ashby Kehr, DO at [address] [facility]       Family History   Problem Relation Age of Onset    Cancer Mother     Heart Attack Father     Cancer Sister        Social History     Tobacco Use    Smoking status: Every Day     Types: Cigars    Smokeless tobacco: Never    Tobacco comments:     3 to 4 cigars daily   Substance Use Topics    Alcohol use: Yes     Alcohol/week: 0.0 standard drinks     Comment: 2-3 glasses wine daily. 1 pot of coffee a day     Drug use: No          Functional Status: The patient is able to ambulate and perform activities of daily living without the use of an assistive device. ROS:   Constitutional: Denies fevers, chills, night sweats, unintentional weight loss     Skin: Denies rash or skin changes     Eyes: Denies vision changes    Ears/Nose/Throat: Denies nasal congestion or sore throat     Respiratory: Denies SOB or cough     Cardiovascular: Denies CP, palpitations, edema      Gastrointestinal: Denies abdominal pain,  N/V, constipation, or diarrhea    Genitourinary: Denies urinary symptoms    Neurologic: See HPI.     MSK: See HPI. Psychiatric: Denies sleep disturbance, anxiety, depression    Hematologic/Lymphatic/Immunologic: Denies bruising       Physical Exam:   Blood pressure (!) 140/60, pulse 62, height 5' 10\" (1.778 m), weight 194 lb (88 kg). General: well developed and well nourished in no acute distress.  Body habitus is non obese  HEENT: No rhinorrhea, sneezing, yawning, or lacrimation. No scleral icterus or conjunctival injection. Resp: symmetrical chest expansion, unlabored breathing, respirations unlabored. CV: Heart rate is regular. Peripheral pulses are palpable  Lymph: No visible regional lymphadenopathy. Skin: No rashes or ecchymosis. Normal turgor. Psych: Mood is calm. Affect is normal.   Ext: No edema noted. Left leg and ankle discoloration, spider veins    MSK:   Back/Hip Exam:   Inspection: Pelvis was asymmetric. Right side higher. Fullness to right gluteus muscles. Lumbar lordotic curvature was decreased. There was no scoliosis. No ecchymoses or erythema. Palpation: Palpatory exam revealed tenderness along lumbosacral paraspinals, no ttp midline spine, no ttp SI joint sulcus, greater trochanters and TFL on bilateral side. There was no paraspinal spasms. There were no trigger points. No ttp right hamstring or calf  ROM: ROM decreased. Pain with flexion. Special/provocative testing:   Supine SLR negative   negative FABERS. Neurological Exam:  Strength:   R  L  Hip Flex  5  5  Knee Ext  5  5  Ankle dorsi  5  5  EHL   5 5  Ankle Plantar  5  5    Sensory:  Intact for light touch in all lower extremity dermatomes. Reflexes:   R  L  Patellar  (1+) (1+)  Ankle Jerk  (0) (0)    Gait is wide based. MRI L Spine   EMG     Impression:   Parish Rose is a 67 y.o. male     1. Lumbosacral radiculitis    2. Chronic bilateral low back pain with bilateral sciatica    3. Tobacco abuse          Plan:   MRI L Spine reviewed and interpreted. He would benefit from right S1 TFESI following by left S1 TFESI. Procedure risks, benefits and alternatives were discussed. Patient would like to proceed. He will continue HEP   Recommend smoking cessation     The patient was educated about the diagnosis, prognosis, indications, risks and benefits of treatment.  An opportunity to ask questions was given to the patient and questions were answered. The patient agreed to proceed with the recommended treatment as described above.      Follow up after injection     Ivy Thurston DO [facility]   Board Certified Physical Medicine and Rehabilitation

## 2023-02-21 NOTE — PROGRESS NOTES
Ivy Thurston, [address] [facility] Physical Medicine and Rehabilitation  [address] [facility] [facility] [place]  Phone: [phone]  Fax: [phone]    PCP: Shagufta Bills MD  Date of visit: 2/21/23    Chief Complaint   Patient presents with    Back Pain     LOV 11/2020     Interval:   Patient presents today for office visit today regarding low back pain. I last saw him in 2020 when he underwent right S1 TFESI. He had almost complete relief with this until recently. He complains of low back pain that radiates into the posterior legs. Described as cramping and an ache. The pain is rated Pain Score: 4. The pain is better with  ibuprofen . The pain is worse with standing and walking. There is no associated numbness/tingling. There is no weakness. There is no bowel/bladder changes.      The prior workup has included: EMG, MRI      The prior treatment has included:  PT: doing HEP   Chiropractic: none    Modalities: none  OTC Tylenol: yes with relief   NSAIDS: ibuprofen with relief of back pain  Opioids: none    Membrane stabilizers: none    Muscle relaxers: none   Previous injections: right S1 TFESI, L4-5 ILESI, right S1 TFESI   Previous surgery at this site: none    No Known Allergies    Current Outpatient Medications   Medication Sig Dispense Refill    amLODIPine (NORVASC) 5 MG tablet Take 5 mg by mouth daily      ibuprofen (ADVIL;MOTRIN) 200 MG tablet Take 600 mg by mouth 2 times daily        Current Facility-Administered Medications   Medication Dose Route Frequency Provider Last Rate Last Admin    perflutren lipid microspheres (DEFINITY) injection 1.65 mg  1.5 mL IntraVENous ONCE PRN Christophe Ramirez MD           Past Medical History:   Diagnosis Date    Hoarseness     began 8/2012; no pain    Hypertension        Past Surgical History:   Procedure Laterality Date    ANESTHESIA NERVE BLOCK Right 8/8/2019    RIGHT S1 TRANSFORAMINAL EPIDURAL STEROID INJECTION performed by Jim Guevara Angel Sat at [address] [facility]  10/18/2012    NERVE BLOCK Right 08/08/2019    right S1 transforaminal epidural     NERVE BLOCK  07/02/2020    interlaminar epidural    NERVE BLOCK Right 11/23/2020    right S1 transformainal epidural steroid injection     NERVE BLOCK Right 11/23/2020    RIGHT S1 TRANSFORAMINAL EPIDURLAL STEROID INJECTION performed by Ashby Kehr, DO at [address] [facility] N/A 7/2/2020    L4-5 INTERLAMINAR EPIDURAL STEROID INJECTION performed by Ashby Kehr, DO at [address] [facility]       Family History   Problem Relation Age of Onset    Cancer Mother     Heart Attack Father     Cancer Sister        Social History     Tobacco Use    Smoking status: Every Day     Types: Cigars    Smokeless tobacco: Never    Tobacco comments:     3 to 4 cigars daily   Substance Use Topics    Alcohol use: Yes     Alcohol/week: 0.0 standard drinks     Comment: 2-3 glasses wine daily. 1 pot of coffee a day     Drug use: No          Functional Status: The patient is able to ambulate and perform activities of daily living without the use of an assistive device. ROS:   Constitutional: Denies fevers, chills, night sweats, unintentional weight loss     Skin: Denies rash or skin changes     Eyes: Denies vision changes    Ears/Nose/Throat: Denies nasal congestion or sore throat     Respiratory: Denies SOB or cough     Cardiovascular: Denies CP, palpitations, edema      Gastrointestinal: Denies abdominal pain,  N/V, constipation, or diarrhea    Genitourinary: Denies urinary symptoms    Neurologic: See HPI.     MSK: See HPI. Psychiatric: Denies sleep disturbance, anxiety, depression    Hematologic/Lymphatic/Immunologic: Denies bruising       Physical Exam:   Blood pressure (!) 140/60, pulse 62, height 5' 10\" (1.778 m), weight 194 lb (88 kg). General: well developed and well nourished in no acute distress.  Body habitus is non obese  HEENT: No rhinorrhea, sneezing, yawning, or lacrimation. No scleral icterus or conjunctival injection. Resp: symmetrical chest expansion, unlabored breathing, respirations unlabored. CV: Heart rate is regular. Peripheral pulses are palpable  Lymph: No visible regional lymphadenopathy. Skin: No rashes or ecchymosis. Normal turgor. Psych: Mood is calm. Affect is normal.   Ext: No edema noted. Left leg and ankle discoloration, spider veins    MSK:   Back/Hip Exam:   Inspection: Pelvis was asymmetric. Right side higher. Fullness to right gluteus muscles. Lumbar lordotic curvature was decreased. There was no scoliosis. No ecchymoses or erythema. Palpation: Palpatory exam revealed tenderness along lumbosacral paraspinals, no ttp midline spine, no ttp SI joint sulcus, greater trochanters and TFL on bilateral side. There was no paraspinal spasms. There were no trigger points. No ttp right hamstring or calf  ROM: ROM decreased. Pain with flexion. Special/provocative testing:   Supine SLR negative   negative FABERS. Neurological Exam:  Strength:   R  L  Hip Flex  5  5  Knee Ext  5  5  Ankle dorsi  5  5  EHL   5 5  Ankle Plantar  5  5    Sensory:  Intact for light touch in all lower extremity dermatomes. Reflexes:   R  L  Patellar  (1+) (1+)  Ankle Jerk  (0) (0)    Gait is wide based. MRI L Spine   EMG     Impression:   Jose Mabry is a 67 y.o. male     1. Lumbosacral radiculitis    2. Chronic bilateral low back pain with bilateral sciatica    3. Tobacco abuse          Plan:   MRI L Spine reviewed and interpreted. He would benefit from right S1 TFESI following by left S1 TFESI. Procedure risks, benefits and alternatives were discussed. Patient would like to proceed. He will continue HEP   Recommend smoking cessation     The patient was educated about the diagnosis, prognosis, indications, risks and benefits of treatment.  An opportunity to ask questions was given to the patient and questions were answered. The patient agreed to proceed with the recommended treatment as described above.      Follow up after injection     Steve Schwartz DO [facility]   Board Certified Physical Medicine and Rehabilitation

## 2023-02-28 ENCOUNTER — PREP FOR PROCEDURE (OUTPATIENT)
Dept: PHYSICAL MEDICINE AND REHAB | Age: 73
End: 2023-02-28

## 2023-03-02 ENCOUNTER — HOSPITAL ENCOUNTER (OUTPATIENT)
Age: 73
Setting detail: OUTPATIENT SURGERY
Discharge: HOME | End: 2023-03-02
Attending: PHYSICAL MEDICINE & REHABILITATION | Admitting: PHYSICAL MEDICINE & REHABILITATION
Payer: MEDICARE

## 2023-03-02 VITALS
BODY MASS INDEX: 27.77 KG/M2 | RESPIRATION RATE: 20 BRPM | SYSTOLIC BLOOD PRESSURE: 188 MMHG | TEMPERATURE: 98 F | DIASTOLIC BLOOD PRESSURE: 55 MMHG | HEIGHT: 70 IN | OXYGEN SATURATION: 98 % | WEIGHT: 194 LBS | HEART RATE: 58 BPM

## 2023-03-02 DIAGNOSIS — M48.061 NEUROFORAMINAL STENOSIS OF LUMBAR SPINE: ICD-10-CM

## 2023-03-02 PROCEDURE — 7100000010 HC PHASE II RECOVERY - FIRST 15 MIN: Performed by: PHYSICAL MEDICINE & REHABILITATION

## 2023-03-02 PROCEDURE — 6360000004 HC RX CONTRAST MEDICATION: Performed by: PHYSICAL MEDICINE & REHABILITATION

## 2023-03-02 PROCEDURE — 6360000002 HC RX W HCPCS: Performed by: PHYSICAL MEDICINE & REHABILITATION

## 2023-03-02 PROCEDURE — 2709999900 HC NON-CHARGEABLE SUPPLY: Performed by: PHYSICAL MEDICINE & REHABILITATION

## 2023-03-02 PROCEDURE — 7100000011 HC PHASE II RECOVERY - ADDTL 15 MIN: Performed by: PHYSICAL MEDICINE & REHABILITATION

## 2023-03-02 PROCEDURE — 2500000003 HC RX 250 WO HCPCS: Performed by: PHYSICAL MEDICINE & REHABILITATION

## 2023-03-02 PROCEDURE — 3600000005 HC SURGERY LEVEL 5 BASE: Performed by: PHYSICAL MEDICINE & REHABILITATION

## 2023-03-02 RX ORDER — SODIUM CHLORIDE 0.9 % (FLUSH) 0.9 %
5-40 SYRINGE (ML) INJECTION EVERY 12 HOURS SCHEDULED
Status: DISCONTINUED | OUTPATIENT
Start: 2023-03-02 | End: 2023-03-02 | Stop reason: HOSPADM

## 2023-03-02 RX ORDER — SODIUM CHLORIDE 0.9 % (FLUSH) 0.9 %
5-40 SYRINGE (ML) INJECTION PRN
Status: DISCONTINUED | OUTPATIENT
Start: 2023-03-02 | End: 2023-03-02 | Stop reason: HOSPADM

## 2023-03-02 RX ORDER — SODIUM CHLORIDE 9 MG/ML
INJECTION, SOLUTION INTRAVENOUS PRN
Status: DISCONTINUED | OUTPATIENT
Start: 2023-03-02 | End: 2023-03-02 | Stop reason: HOSPADM

## 2023-03-02 RX ORDER — LIDOCAINE HYDROCHLORIDE 10 MG/ML
INJECTION, SOLUTION EPIDURAL; INFILTRATION; INTRACAUDAL; PERINEURAL PRN
Status: DISCONTINUED | OUTPATIENT
Start: 2023-03-02 | End: 2023-03-02 | Stop reason: ALTCHOICE

## 2023-03-02 ASSESSMENT — PAIN SCALES - GENERAL
PAINLEVEL_OUTOF10: 0
PAINLEVEL_OUTOF10: 0

## 2023-03-02 ASSESSMENT — PAIN DESCRIPTION - DESCRIPTORS: DESCRIPTORS: ACHING

## 2023-03-02 ASSESSMENT — PAIN - FUNCTIONAL ASSESSMENT: PAIN_FUNCTIONAL_ASSESSMENT: 0-10

## 2023-03-02 NOTE — DISCHARGE INSTRUCTIONS
Post-op instruction Block  Dr. Shayla Nava  [phone]      Rest 12-24 hours following procedure. DO NOT DRIVE till the following day. Keep dressing clean and dry. Do not bathe, shower, or sit in hot tub the day of procedure . You may remove the dressing following A.M. You may return to work/school tomorrow. Drink extra fluids for the next 24 hours. Resume your regular diet. Resume previously prescribed medications. Resume Coumadin, Plavix, NSAIDS, Ibuprofen products 24 hours after procedure. You need to have a responsible adult stay with you this evening. If you experience any discomfort relating to this procedure, you may take Tylenol 2 tablets every 4 hrs as needed for pain and/or apply ice to the affected area. Please follow up with Dr. Shayla Nava or Dr. Santos Paulino. If you were a hip injection follow up with your orthopedic Doctor. If you experience any unusual symptoms or problems, call your physicians answering service at [phone] or go directly to [address] [place] [address]. [facility] Emergency Department. Infection After Surgery: Care Instructions  Overview  After surgery, an infection is always possible. It doesn't mean that the surgery didn't go well. Because an infection can be serious, your doctor has taken steps to manage it. Your doctor checked the infection and cleaned it if necessary. Your doctor may have made an opening in the area so that the pus can drain out. You may have gauze in the cut so that the area will stay open and keep draining. You may need antibiotics. You will need to follow up with your doctor to make sure the infection has gone away. Follow-up care is a key part of your treatment and safety. Be sure to make and go to all appointments, and call your doctor if you are having problems. It's also a good idea to know your test results and keep a list of the medicines you take. How can you care for yourself at home?   Make sure your surgeon knows about the infection, especially if you saw another doctor about your symptoms. If your doctor prescribed antibiotics, take them as directed. Do not stop taking them just because you feel better. You need to take the full course of antibiotics. Ask your doctor if you can take an over-the-counter pain medicine, such as acetaminophen (Tylenol), ibuprofen (Advil, Motrin), or naproxen (Aleve). Be safe with medicines. Read and follow all instructions on the label. Do not take two or more pain medicines at the same time unless the doctor told you to. Many pain medicines have acetaminophen, which is Tylenol. Too much acetaminophen (Tylenol) can be harmful. Prop up the area on a pillow anytime you sit or lie down during the next 3 days. Try to keep it above the level of your heart. This will help reduce swelling. Keep the skin clean and dry. You may have a bandage over the cut (incision). A bandage helps the incision heal and protects it. Your doctor will tell you how to take care of this. Keep it clean and dry. You may have drainage from the wound. If your doctor told you how to care for your incision, follow your doctor's instructions. If you did not get instructions, follow this general advice:  Wash around the incision with clean water 2 times a day. Don't use hydrogen peroxide or alcohol, which can slow healing. When should you call for help? Call your doctor now or seek immediate medical care if:    You have signs that your infection is getting worse, such as: Increased pain, swelling, warmth, or redness in the area. Red streaks leading from the area. Pus draining from the wound. A new or higher fever. Watch closely for changes in your health, and be sure to contact your doctor if you have any problems. Where can you learn more? Go to http://www.woods.com/ and enter C340 to learn more about \"Infection After Surgery: Care Instructions. \"  Current as of: January 20, 2022 Content Version: 13.5  © 7102-7300 [employer]wise, Incorporated. Care instructions adapted under license by [facility] ([facility]). If you have questions about a medical condition or this instruction, always ask your healthcare professional. [address] any warranty or liability for your use of this information.

## 2023-03-02 NOTE — OP NOTE
LUMBOSACRAL EPIDURAL STEROID INJECTION, TRANSFORAMINAL APPROACH       WITH FLUOROSCOPIC GUIDANCE    Patient: Aimee June                         MRN#: [id number]  : 1950   Date of procedure: 3/2/2023      Physician Performing Procedure:  Geovanna Jordan DO    Clinical Scenario: As per electronic documentation. Diagnosis: lumbar radiculitis     Injectate: A total of 3cc, consisting of 1 cc of Dexamethasone 10mg/ml,  with the remainder of normal saline    Levels Treated:  right S1 neural foramen    Approach:   Transforaminal    Improvement after today's procedure: As per nursing record. Comments:  none     Pre-procedural evaluation: The patient was examined today just prior to performing the procedure listed above. The patient's heart rate was normal, lungs were clear to auscultation bilaterally. Procedure: The patient was prepped and draped in a sterile fashion in the prone position after informed consent was signed and all the patient's questions were answered including the risks, benefits, alternative treatment options, and prognosis. The risks include - but are not limited to - infection, allergic reaction, increased pain, lack of therapeutic benefit, steroid reaction, nerve damage, paralysis, stroke, epidural hematoma, syncope, headache, respiratory or cardiac arrest, and scar formation. The C-arm was positioned so that an oblique view of the neural foramen as noted above was visualized. The soft tissues overlying this structure were infiltrated with 2-3 cc. of 1% Lidocaine without Epinephrine. A 22 gauge 3.5 inch spinal needle was inserted toward the target using a trajectory view along the fluoroscope beam.  Under AP and lateral visualization, the needle was advanced so it did not puncture dura. Biplanar projections were used to confirm position. Aspiration was confirmed to be negative for CSF and/or blood.   A 1-2 cc. volume of Isovue contrast was injected at this level.  The contrast was observed to flow under the pedicle. Radiographs were obtained for documentation purposes. After attaining flow of contrast documented above, the above injectate was administered into the transforaminal epidural space. The patient tolerated the procedure well and was discharged after an appropriate period of observation. If there are any complications, the patient was instructed to call us. The patient is to follow-up with the requesting physician after the injection, preferably within three weeks.     Ashley Nunez DO, [facility]   Board Certified Physical Medicine and Rehabilitation

## 2023-03-02 NOTE — H&P
[facility], [address] [facility] Physical Medicine and Rehabilitation  [address] [facility] [place]  Phone: [phone]  Fax: [phone]     PCP: Isaac Chris MD  Date of visit: 2/21/23          Chief Complaint   Patient presents with    Back Pain       LOV 11/2020      Interval:   Patient presents today for office visit today regarding low back pain. I last saw him in 2020 when he underwent right S1 TFESI. He had almost complete relief with this until recently. He complains of low back pain that radiates into the posterior legs. Described as cramping and an ache. The pain is rated Pain Score: 4. The pain is better with ibuprofen. The pain is worse with standing and walking. There is no associated numbness/tingling. There is no weakness. There is no bowel/bladder changes.       The prior workup has included: EMG, MRI       The prior treatment has included:  PT: doing HEP   Chiropractic: none    Modalities: none  OTC Tylenol: yes with relief   NSAIDS: ibuprofen with relief of back pain  Opioids: none    Membrane stabilizers: none    Muscle relaxers: none   Previous injections: right S1 TFESI, L4-5 ILESI, right S1 TFESI   Previous surgery at this site: none     No Known Allergies            Current Outpatient Medications   Medication Sig Dispense Refill    amLODIPine (NORVASC) 5 MG tablet Take 5 mg by mouth daily        ibuprofen (ADVIL;MOTRIN) 200 MG tablet Take 600 mg by mouth 2 times daily                     Current Facility-Administered Medications   Medication Dose Route Frequency Provider Last Rate Last Admin    perflutren lipid microspheres (DEFINITY) injection 1.65 mg  1.5 mL IntraVENous ONCE PRN Korina Barrera MD                  Past Medical History:   Diagnosis Date    Hoarseness       began 8/2012; no pain    Hypertension                 Past Surgical History:   Procedure Laterality Date    ANESTHESIA NERVE BLOCK Right 8/8/2019     RIGHT S1 TRANSFORAMINAL EPIDURAL STEROID INJECTION performed by Rachid Skelton DO at [address] [facility]   10/18/2012    NERVE BLOCK Right 08/08/2019     right S1 transforaminal epidural     NERVE BLOCK   07/02/2020     interlaminar epidural    NERVE BLOCK Right 11/23/2020     right S1 transformainal epidural steroid injection     NERVE BLOCK Right 11/23/2020     RIGHT S1 TRANSFORAMINAL EPIDURLAL STEROID INJECTION performed by Rachid Skelton DO at [address] [facility] N/A 7/2/2020     L4-5 INTERLAMINAR EPIDURAL STEROID INJECTION performed by Rachid Skelton DO at [address] [facility]               Family History   Problem Relation Age of Onset    Cancer Mother      Heart Attack Father      Cancer Sister           Social History            Tobacco Use    Smoking status: Every Day       Types: Cigars    Smokeless tobacco: Never    Tobacco comments:       3 to 4 cigars daily   Substance Use Topics    Alcohol use: Yes       Alcohol/week: 0.0 standard drinks       Comment: 2-3 glasses wine daily. 1 pot of coffee a day     Drug use: No            Functional Status: The patient is able to ambulate and perform activities of daily living without the use of an assistive device. ROS:   Constitutional: Denies fevers, chills, night sweats, unintentional weight loss     Skin: Denies rash or skin changes     Eyes: Denies vision changes    Ears/Nose/Throat: Denies nasal congestion or sore throat     Respiratory: Denies SOB or cough     Cardiovascular: Denies CP, palpitations, edema      Gastrointestinal: Denies abdominal pain,  N/V, constipation, or diarrhea    Genitourinary: Denies urinary symptoms    Neurologic: See HPI.     MSK: See HPI. Psychiatric: Denies sleep disturbance, anxiety, depression    Hematologic/Lymphatic/Immunologic: Denies bruising         Physical Exam:   Blood pressure (!) 140/60, pulse 62, height 5' 10\" (1.778 m), weight 194 lb (88 kg). General: well developed and well nourished in no acute distress. Body habitus is non obese  HEENT: No rhinorrhea, sneezing, yawning, or lacrimation. No scleral icterus or conjunctival injection. Resp: symmetrical chest expansion, unlabored breathing, respirations unlabored. CV: Heart rate is regular. Peripheral pulses are palpable  Lymph: No visible regional lymphadenopathy. Skin: No rashes or ecchymosis. Normal turgor. Psych: Mood is calm. Affect is normal.   Ext: No edema noted. Left leg and ankle discoloration, spider veins     MSK:   Back/Hip Exam:   Inspection: Pelvis was asymmetric. Right side higher. Fullness to right gluteus muscles. Lumbar lordotic curvature was decreased. There was no scoliosis. No ecchymoses or erythema. Palpation: Palpatory exam revealed tenderness along lumbosacral paraspinals, no ttp midline spine, no ttp SI joint sulcus, greater trochanters and TFL on bilateral side. There was no paraspinal spasms. There were no trigger points. No ttp right hamstring or calf  ROM: ROM decreased. Pain with flexion. Special/provocative testing:   Supine SLR negative   negative FABERS. Neurological Exam:  Strength:                     R          L  Hip Flex                       5          5  Knee Ext                     5          5  Ankle dorsi                  5          5  EHL                             5          5  Ankle Plantar               5          5     Sensory:  Intact for light touch in all lower extremity dermatomes. Reflexes:                     R          L  Patellar                        (1+)      (1+)  Ankle Jerk                   (0)        (0)     Gait is wide based. MRI L Spine   EMG      Impression:   Saad Rubio is a 67 y.o. male      1. Lumbosacral radiculitis    2. Chronic bilateral low back pain with bilateral sciatica    3. Tobacco abuse             Plan:   MRI L Spine reviewed and interpreted.    He would benefit from right S1 TFESI following by left S1 TFESI. Procedure risks, benefits and alternatives were discussed. Patient would like to proceed. He will continue HEP   Recommend smoking cessation      The patient was educated about the diagnosis, prognosis, indications, risks and benefits of treatment. An opportunity to ask questions was given to the patient and questions were answered. The patient agreed to proceed with the recommended treatment as described above.       Follow up after injection      Sara Reddy DO [facility]   Board Certified Physical Medicine and Rehabilitation

## 2023-03-15 RX ORDER — SODIUM CHLORIDE 0.9 % (FLUSH) 0.9 %
5-40 SYRINGE (ML) INJECTION PRN
Status: CANCELLED | OUTPATIENT
Start: 2023-03-15

## 2023-03-15 RX ORDER — SODIUM CHLORIDE 0.9 % (FLUSH) 0.9 %
5-40 SYRINGE (ML) INJECTION EVERY 12 HOURS SCHEDULED
Status: CANCELLED | OUTPATIENT
Start: 2023-03-15

## 2023-03-15 RX ORDER — SODIUM CHLORIDE 9 MG/ML
INJECTION, SOLUTION INTRAVENOUS PRN
Status: CANCELLED | OUTPATIENT
Start: 2023-03-15

## 2023-03-16 ENCOUNTER — HOSPITAL ENCOUNTER (OUTPATIENT)
Dept: OPERATING ROOM | Age: 73
Setting detail: OUTPATIENT SURGERY
Discharge: HOME | End: 2023-03-16
Attending: PHYSICAL MEDICINE & REHABILITATION
Payer: MEDICARE

## 2023-03-16 ENCOUNTER — HOSPITAL ENCOUNTER (OUTPATIENT)
Age: 73
Setting detail: OUTPATIENT SURGERY
Discharge: HOME | End: 2023-03-16
Attending: PHYSICAL MEDICINE & REHABILITATION | Admitting: PHYSICAL MEDICINE & REHABILITATION
Payer: MEDICARE

## 2023-03-16 VITALS
BODY MASS INDEX: 27.77 KG/M2 | TEMPERATURE: 98 F | SYSTOLIC BLOOD PRESSURE: 173 MMHG | HEART RATE: 72 BPM | DIASTOLIC BLOOD PRESSURE: 72 MMHG | WEIGHT: 194 LBS | RESPIRATION RATE: 14 BRPM | OXYGEN SATURATION: 99 % | HEIGHT: 70 IN

## 2023-03-16 DIAGNOSIS — M51.9 LUMBAR DISC DISEASE: ICD-10-CM

## 2023-03-16 PROCEDURE — 2709999900 HC NON-CHARGEABLE SUPPLY: Performed by: PHYSICAL MEDICINE & REHABILITATION

## 2023-03-16 PROCEDURE — 2500000003 HC RX 250 WO HCPCS: Performed by: PHYSICAL MEDICINE & REHABILITATION

## 2023-03-16 PROCEDURE — 7100000010 HC PHASE II RECOVERY - FIRST 15 MIN: Performed by: PHYSICAL MEDICINE & REHABILITATION

## 2023-03-16 PROCEDURE — 6360000002 HC RX W HCPCS: Performed by: PHYSICAL MEDICINE & REHABILITATION

## 2023-03-16 PROCEDURE — 3209999900 FLUORO FOR SURGICAL PROCEDURES

## 2023-03-16 PROCEDURE — 6360000004 HC RX CONTRAST MEDICATION: Performed by: PHYSICAL MEDICINE & REHABILITATION

## 2023-03-16 PROCEDURE — 2580000003 HC RX 258: Performed by: PHYSICAL MEDICINE & REHABILITATION

## 2023-03-16 PROCEDURE — A4216 STERILE WATER/SALINE, 10 ML: HCPCS | Performed by: PHYSICAL MEDICINE & REHABILITATION

## 2023-03-16 PROCEDURE — 3600000005 HC SURGERY LEVEL 5 BASE: Performed by: PHYSICAL MEDICINE & REHABILITATION

## 2023-03-16 RX ORDER — SODIUM CHLORIDE 9 MG/ML
INJECTION, SOLUTION INTRAVENOUS PRN
Status: DISCONTINUED | OUTPATIENT
Start: 2023-03-16 | End: 2023-03-16 | Stop reason: HOSPADM

## 2023-03-16 RX ORDER — LIDOCAINE HYDROCHLORIDE 10 MG/ML
INJECTION, SOLUTION EPIDURAL; INFILTRATION; INTRACAUDAL; PERINEURAL PRN
Status: DISCONTINUED | OUTPATIENT
Start: 2023-03-16 | End: 2023-03-16 | Stop reason: ALTCHOICE

## 2023-03-16 RX ORDER — SODIUM CHLORIDE 0.9 % (FLUSH) 0.9 %
5-40 SYRINGE (ML) INJECTION PRN
Status: DISCONTINUED | OUTPATIENT
Start: 2023-03-16 | End: 2023-03-16 | Stop reason: HOSPADM

## 2023-03-16 RX ORDER — SODIUM CHLORIDE 0.9 % (FLUSH) 0.9 %
5-40 SYRINGE (ML) INJECTION EVERY 12 HOURS SCHEDULED
Status: DISCONTINUED | OUTPATIENT
Start: 2023-03-16 | End: 2023-03-16 | Stop reason: HOSPADM

## 2023-03-16 ASSESSMENT — PAIN - FUNCTIONAL ASSESSMENT: PAIN_FUNCTIONAL_ASSESSMENT: 0-10

## 2023-03-16 NOTE — H&P
Isaiah Wood, [address] [facility] Physical Medicine and Rehabilitation  [address] [facility] [place]  Phone: [phone]  Fax: [phone]     PCP: Maria De Jesus Johnson MD  Date of visit: 2/21/23          Chief Complaint   Patient presents with    Back Pain       LOV 11/2020      Interval:   Patient presents today for office visit today regarding low back pain. I last saw him in 2020 when he underwent right S1 TFESI. He had almost complete relief with this until recently. He complains of low back pain that radiates into the posterior legs. Described as cramping and an ache. The pain is rated Pain Score: 4. The pain is better with ibuprofen. The pain is worse with standing and walking. There is no associated numbness/tingling. There is no weakness. There is no bowel/bladder changes.       The prior workup has included: EMG, MRI       The prior treatment has included:  PT: doing HEP   Chiropractic: none    Modalities: none  OTC Tylenol: yes with relief   NSAIDS: ibuprofen with relief of back pain  Opioids: none    Membrane stabilizers: none    Muscle relaxers: none   Previous injections: right S1 TFESI, L4-5 ILESI, right S1 TFESI   Previous surgery at this site: none     No Known Allergies            Current Outpatient Medications   Medication Sig Dispense Refill    amLODIPine (NORVASC) 5 MG tablet Take 5 mg by mouth daily        ibuprofen (ADVIL;MOTRIN) 200 MG tablet Take 600 mg by mouth 2 times daily                     Current Facility-Administered Medications   Medication Dose Route Frequency Provider Last Rate Last Admin    perflutren lipid microspheres (DEFINITY) injection 1.65 mg  1.5 mL IntraVENous ONCE PRN Gail Rees MD                  Past Medical History:   Diagnosis Date    Hoarseness       began 8/2012; no pain    Hypertension                 Past Surgical History:   Procedure Laterality Date    ANESTHESIA NERVE BLOCK Right 8/8/2019     RIGHT S1 TRANSFORAMINAL EPIDURAL STEROID INJECTION performed by Asad Gutiérrez DO at [address] [facility]   10/18/2012    NERVE BLOCK Right 08/08/2019     right S1 transforaminal epidural     NERVE BLOCK   07/02/2020     interlaminar epidural    NERVE BLOCK Right 11/23/2020     right S1 transformainal epidural steroid injection     NERVE BLOCK Right 11/23/2020     RIGHT S1 TRANSFORAMINAL EPIDURLAL STEROID INJECTION performed by Asad Gutiérrez DO at [address] [facility] N/A 7/2/2020     L4-5 INTERLAMINAR EPIDURAL STEROID INJECTION performed by Asad Gutiérrez DO at [address] [facility]               Family History   Problem Relation Age of Onset    Cancer Mother      Heart Attack Father      Cancer Sister           Social History            Tobacco Use    Smoking status: Every Day       Types: Cigars    Smokeless tobacco: Never    Tobacco comments:       3 to 4 cigars daily   Substance Use Topics    Alcohol use: Yes       Alcohol/week: 0.0 standard drinks       Comment: 2-3 glasses wine daily. 1 pot of coffee a day     Drug use: No            Functional Status: The patient is able to ambulate and perform activities of daily living without the use of an assistive device. ROS:   Constitutional: Denies fevers, chills, night sweats, unintentional weight loss     Skin: Denies rash or skin changes     Eyes: Denies vision changes    Ears/Nose/Throat: Denies nasal congestion or sore throat     Respiratory: Denies SOB or cough     Cardiovascular: Denies CP, palpitations, edema      Gastrointestinal: Denies abdominal pain,  N/V, constipation, or diarrhea    Genitourinary: Denies urinary symptoms    Neurologic: See HPI.     MSK: See HPI. Psychiatric: Denies sleep disturbance, anxiety, depression    Hematologic/Lymphatic/Immunologic: Denies bruising         Physical Exam:   Blood pressure (!) 140/60, pulse 62, height 5' 10\" (1.778 m), weight 194 lb (88 kg). General: well developed and well nourished in no acute distress. Body habitus is non obese  HEENT: No rhinorrhea, sneezing, yawning, or lacrimation. No scleral icterus or conjunctival injection. Resp: symmetrical chest expansion, unlabored breathing, respirations unlabored. CV: Heart rate is regular. Peripheral pulses are palpable  Lymph: No visible regional lymphadenopathy. Skin: No rashes or ecchymosis. Normal turgor. Psych: Mood is calm. Affect is normal.   Ext: No edema noted. Left leg and ankle discoloration, spider veins     MSK:   Back/Hip Exam:   Inspection: Pelvis was asymmetric. Right side higher. Fullness to right gluteus muscles. Lumbar lordotic curvature was decreased. There was no scoliosis. No ecchymoses or erythema. Palpation: Palpatory exam revealed tenderness along lumbosacral paraspinals, no ttp midline spine, no ttp SI joint sulcus, greater trochanters and TFL on bilateral side. There was no paraspinal spasms. There were no trigger points. No ttp right hamstring or calf  ROM: ROM decreased. Pain with flexion. Special/provocative testing:   Supine SLR negative   negative FABERS. Neurological Exam:  Strength:                     R          L  Hip Flex                       5          5  Knee Ext                     5          5  Ankle dorsi                  5          5  EHL                             5          5  Ankle Plantar               5          5     Sensory:  Intact for light touch in all lower extremity dermatomes. Reflexes:                     R          L  Patellar                        (1+)      (1+)  Ankle Jerk                   (0)        (0)     Gait is wide based. MRI L Spine   EMG      Impression:   Kathryn Hickey is a 67 y.o. male      1. Lumbosacral radiculitis    2. Chronic bilateral low back pain with bilateral sciatica    3. Tobacco abuse             Plan:   MRI L Spine reviewed and interpreted.    He would benefit from right S1 TFESI following by left S1 TFESI. Procedure risks, benefits and alternatives were discussed. Patient would like to proceed. He will continue HEP   Recommend smoking cessation      The patient was educated about the diagnosis, prognosis, indications, risks and benefits of treatment. An opportunity to ask questions was given to the patient and questions were answered. The patient agreed to proceed with the recommended treatment as described above.       Follow up after injection      Gerard Ba DO, [facility]   Board Certified Physical Medicine and Rehabilitation

## 2023-03-16 NOTE — OP NOTE
LUMBOSACRAL EPIDURAL STEROID INJECTION, TRANSFORAMINAL APPROACH       WITH FLUOROSCOPIC GUIDANCE    Patient: Yahaira Gutierrez                         MRN#: [id number]  : 1950   Date of procedure: 3/16/2023      Physician Performing Procedure:  Babar Murray DO    Clinical Scenario: As per electronic documentation. Diagnosis: lumbar radiculitis     Injectate: A total of 3cc, consisting of 1 cc of Dexamethasone 10mg/ml,  with the remainder of normal saline    Levels Treated:  left S1 neural foramen    Approach:  Transforaminal    Improvement after today's procedure: As per nursing record. Comments: none      Pre-procedural evaluation: The patient was examined today just prior to performing the procedure listed above. The patient's heart rate was normal, lungs were clear to auscultation bilaterally. Procedure: The patient was prepped and draped in a sterile fashion in the prone position after informed consent was signed and all the patient's questions were answered including the risks, benefits, alternative treatment options, and prognosis. The risks include - but are not limited to - infection, allergic reaction, increased pain, lack of therapeutic benefit, steroid reaction, nerve damage, paralysis, stroke, epidural hematoma, syncope, headache, respiratory or cardiac arrest, and scar formation. The C-arm was positioned so that an oblique view of the neural foramen as noted above was visualized. The soft tissues overlying this structure were infiltrated with 2-3 cc. of 1% Lidocaine without Epinephrine. A 22 gauge 3.5 inch spinal needle was inserted toward the target using a trajectory view along the fluoroscope beam.  Under AP and lateral visualization, the needle was advanced so it did not puncture dura. Biplanar projections were used to confirm position. Aspiration was confirmed to be negative for CSF and/or blood. A 1-2 cc. volume of Isovue contrast was injected at this level. The contrast was observed to flow under the pedicle. Radiographs were obtained for documentation purposes. After attaining flow of contrast documented above, the above injectate was administered into the transforaminal epidural space. The patient tolerated the procedure well and was discharged after an appropriate period of observation. If there are any complications, the patient was instructed to call us. The patient is to follow-up with the requesting physician after the injection, preferably within three weeks.     Steve Schwartz DO, [facility]   Board Certified Physical Medicine and Rehabilitation

## 2023-03-16 NOTE — DISCHARGE INSTRUCTIONS
Post-op instruction Block  Dr. Martha Funk  [phone]      Rest 12-24 hours following procedure. DO NOT DRIVE till the following day. Keep dressing clean and dry. Do not bathe, shower, or sit in hot tub the day of procedure . You may remove the dressing following A.M. You may return to work/school tomorrow. Drink extra fluids for the next 24 hours. Resume your regular diet. Resume previously prescribed medications. Resume Coumadin, Plavix, NSAIDS, Ibuprofen products 24 hours after procedure. You need to have a responsible adult stay with you this evening. If you experience any discomfort relating to this procedure, you may take Tylenol 2 tablets every 4 hrs as needed for pain and/or apply ice to the affected area. Please follow up with Dr. Martha Funk or Dr. TURK [facility]. If you were a hip injection follow up with your orthopedic Doctor. If you experience any unusual symptoms or problems, call your physicians answering service at [phone] or go directly to [facility]. [facility] Emergency Department.

## 2023-04-26 DIAGNOSIS — M54.17 LUMBOSACRAL RADICULITIS: ICD-10-CM

## 2023-08-04 ENCOUNTER — TELEPHONE (OUTPATIENT)
Dept: ADMINISTRATIVE | Age: 73
End: 2023-08-04

## 2023-08-31 ENCOUNTER — OFFICE VISIT (OUTPATIENT)
Dept: CARDIOLOGY CLINIC | Age: 73
End: 2023-08-31
Payer: MEDICARE

## 2023-08-31 VITALS
SYSTOLIC BLOOD PRESSURE: 154 MMHG | DIASTOLIC BLOOD PRESSURE: 60 MMHG | WEIGHT: 187.2 LBS | HEIGHT: 70 IN | BODY MASS INDEX: 26.8 KG/M2 | RESPIRATION RATE: 18 BRPM | HEART RATE: 50 BPM

## 2023-08-31 DIAGNOSIS — I10 ESSENTIAL HYPERTENSION: ICD-10-CM

## 2023-08-31 DIAGNOSIS — I35.0 AORTIC VALVE STENOSIS, ETIOLOGY OF CARDIAC VALVE DISEASE UNSPECIFIED: Primary | ICD-10-CM

## 2023-08-31 PROCEDURE — 3077F SYST BP >= 140 MM HG: CPT | Performed by: INTERNAL MEDICINE

## 2023-08-31 PROCEDURE — 93000 ELECTROCARDIOGRAM COMPLETE: CPT | Performed by: INTERNAL MEDICINE

## 2023-08-31 PROCEDURE — G8417 CALC BMI ABV UP PARAM F/U: HCPCS | Performed by: INTERNAL MEDICINE

## 2023-08-31 PROCEDURE — 3078F DIAST BP <80 MM HG: CPT | Performed by: INTERNAL MEDICINE

## 2023-08-31 PROCEDURE — 1123F ACP DISCUSS/DSCN MKR DOCD: CPT | Performed by: INTERNAL MEDICINE

## 2023-08-31 PROCEDURE — 99214 OFFICE O/P EST MOD 30 MIN: CPT | Performed by: INTERNAL MEDICINE

## 2023-08-31 PROCEDURE — 3017F COLORECTAL CA SCREEN DOC REV: CPT | Performed by: INTERNAL MEDICINE

## 2023-08-31 PROCEDURE — G8427 DOCREV CUR MEDS BY ELIG CLIN: HCPCS | Performed by: INTERNAL MEDICINE

## 2023-08-31 PROCEDURE — 4004F PT TOBACCO SCREEN RCVD TLK: CPT | Performed by: INTERNAL MEDICINE

## 2023-08-31 NOTE — PROGRESS NOTES
present. Carotid bruit is not present and has normal upstroke and amplitude   Cardiovascular: Normal rate, regular rhythm, ntact distal pulses. Exam reveals no gallop and no friction rub. Grade III/VI late peaking AS murmur heard. S2 NOT audible. Pulmonary/Chest: Effort normal and breath sounds normal. No respiratory distress. No wheezes. No rales. Abdominal: Soft. Bowel sounds are normal.   Musculoskeletal: bilateral ankle edema, L>R   Neurological: Alert and oriented to person, place, and time. Skin: Skin is warm and dry. Psychiatric: Normal mood and affect. Behavior is normal.     EKG:  sinus bradycardia, rate 50 otherwise normal .    ASSESSMENT AND PLAN:  Patient Active Problem List   Diagnosis    Lumbosacral radiculitis    Spinal stenosis of lumbar region with neurogenic claudication    Aortic stenosis    Essential hypertension    Sinus bradycardia     Patient with aortic stenosis,  severe by exam, asymptomatic  Also marked sinus bradycardia on no negative chronotropic meds  Review of both conditions with patient and wife, including symptoms referable to same, and therapies such as TAVR and PCK  Echo for valve area and EF  Would consider discontinuing norvasc given marked peripheral edema, and utilizing ACE/ARB if BMP acceptable, or thiazide diuretic.   He should also follow low salt diet, and use support stockings (above knee)                   Yessy Godfrey M.D  [facility] Cardiology

## 2023-10-18 ENCOUNTER — HOSPITAL ENCOUNTER (OUTPATIENT)
Dept: CARDIOLOGY | Age: 73
Discharge: HOME | End: 2023-10-20
Attending: INTERNAL MEDICINE
Payer: MEDICARE

## 2023-10-18 ENCOUNTER — TELEPHONE (OUTPATIENT)
Dept: CARDIOLOGY CLINIC | Age: 73
End: 2023-10-18

## 2023-10-18 DIAGNOSIS — I35.0 AORTIC VALVE STENOSIS, ETIOLOGY OF CARDIAC VALVE DISEASE UNSPECIFIED: ICD-10-CM

## 2023-10-18 DIAGNOSIS — I35.0 AORTIC VALVE STENOSIS, ETIOLOGY OF CARDIAC VALVE DISEASE UNSPECIFIED: Primary | ICD-10-CM

## 2023-10-18 PROCEDURE — 93306 TTE W/DOPPLER COMPLETE: CPT

## 2023-10-18 NOTE — TELEPHONE ENCOUNTER
Patient notified and instructed on echo results and recommendations.   He agrees for eval at valve clinic

## 2023-10-18 NOTE — TELEPHONE ENCOUNTER
----- Message from Anshu Peres MD sent at 10/18/2023  1:10 PM EDT -----  Echo shows valve worsening  Would like him to be seen in valve clinic for TAVR consideration

## 2023-10-26 ENCOUNTER — TELEPHONE (OUTPATIENT)
Dept: CARDIOLOGY CLINIC | Age: 73
End: 2023-10-26

## 2023-10-26 NOTE — TELEPHONE ENCOUNTER
Called the patient to schedule him an appointment with the valve clinic.  Left message for him to call back

## 2023-11-30 ENCOUNTER — OFFICE VISIT (OUTPATIENT)
Dept: CARDIOTHORACIC SURGERY | Age: 73
End: 2023-11-30
Payer: MEDICARE

## 2023-11-30 ENCOUNTER — OFFICE VISIT (OUTPATIENT)
Dept: CARDIOLOGY CLINIC | Age: 73
End: 2023-11-30
Payer: MEDICARE

## 2023-11-30 VITALS
DIASTOLIC BLOOD PRESSURE: 73 MMHG | WEIGHT: 185 LBS | RESPIRATION RATE: 20 BRPM | HEART RATE: 51 BPM | BODY MASS INDEX: 26.48 KG/M2 | HEIGHT: 70 IN | SYSTOLIC BLOOD PRESSURE: 177 MMHG

## 2023-11-30 DIAGNOSIS — I35.0 NONRHEUMATIC AORTIC VALVE STENOSIS: Primary | ICD-10-CM

## 2023-11-30 DIAGNOSIS — I35.0 SEVERE AORTIC STENOSIS: Primary | ICD-10-CM

## 2023-11-30 PROCEDURE — G8484 FLU IMMUNIZE NO ADMIN: HCPCS | Performed by: STUDENT IN AN ORGANIZED HEALTH CARE EDUCATION/TRAINING PROGRAM

## 2023-11-30 PROCEDURE — 1123F ACP DISCUSS/DSCN MKR DOCD: CPT | Performed by: INTERNAL MEDICINE

## 2023-11-30 PROCEDURE — 3074F SYST BP LT 130 MM HG: CPT | Performed by: STUDENT IN AN ORGANIZED HEALTH CARE EDUCATION/TRAINING PROGRAM

## 2023-11-30 PROCEDURE — 3017F COLORECTAL CA SCREEN DOC REV: CPT | Performed by: INTERNAL MEDICINE

## 2023-11-30 PROCEDURE — 99205 OFFICE O/P NEW HI 60 MIN: CPT | Performed by: INTERNAL MEDICINE

## 2023-11-30 PROCEDURE — 3078F DIAST BP <80 MM HG: CPT | Performed by: STUDENT IN AN ORGANIZED HEALTH CARE EDUCATION/TRAINING PROGRAM

## 2023-11-30 PROCEDURE — 4004F PT TOBACCO SCREEN RCVD TLK: CPT | Performed by: INTERNAL MEDICINE

## 2023-11-30 PROCEDURE — 99204 OFFICE O/P NEW MOD 45 MIN: CPT | Performed by: STUDENT IN AN ORGANIZED HEALTH CARE EDUCATION/TRAINING PROGRAM

## 2023-11-30 PROCEDURE — 3017F COLORECTAL CA SCREEN DOC REV: CPT | Performed by: STUDENT IN AN ORGANIZED HEALTH CARE EDUCATION/TRAINING PROGRAM

## 2023-11-30 PROCEDURE — G8428 CUR MEDS NOT DOCUMENT: HCPCS | Performed by: INTERNAL MEDICINE

## 2023-11-30 PROCEDURE — 4004F PT TOBACCO SCREEN RCVD TLK: CPT | Performed by: STUDENT IN AN ORGANIZED HEALTH CARE EDUCATION/TRAINING PROGRAM

## 2023-11-30 PROCEDURE — G8484 FLU IMMUNIZE NO ADMIN: HCPCS | Performed by: INTERNAL MEDICINE

## 2023-11-30 PROCEDURE — G8417 CALC BMI ABV UP PARAM F/U: HCPCS | Performed by: STUDENT IN AN ORGANIZED HEALTH CARE EDUCATION/TRAINING PROGRAM

## 2023-11-30 PROCEDURE — G8417 CALC BMI ABV UP PARAM F/U: HCPCS | Performed by: INTERNAL MEDICINE

## 2023-11-30 PROCEDURE — G8427 DOCREV CUR MEDS BY ELIG CLIN: HCPCS | Performed by: STUDENT IN AN ORGANIZED HEALTH CARE EDUCATION/TRAINING PROGRAM

## 2023-11-30 PROCEDURE — 1123F ACP DISCUSS/DSCN MKR DOCD: CPT | Performed by: STUDENT IN AN ORGANIZED HEALTH CARE EDUCATION/TRAINING PROGRAM

## 2023-11-30 RX ORDER — LOSARTAN POTASSIUM 50 MG/1
50 TABLET ORAL DAILY
COMMUNITY

## 2023-11-30 ASSESSMENT — ENCOUNTER SYMPTOMS
NAUSEA: 0
ABDOMINAL PAIN: 0
SHORTNESS OF BREATH: 0
VOMITING: 0
BLOOD IN STOOL: 0
CHEST TIGHTNESS: 0
COUGH: 0
BACK PAIN: 0

## 2023-11-30 NOTE — PROGRESS NOTES
OFFICE VISIT    NAME: Judie Rodriguez     YOB: 1950   AGE: 67 y.o. MEDICAL RECORD NUMBER: [id number]       CONSULTATION INFORMATION:   DATE OF CLINIC CONSULTATION: 11/30/2023   PRINCIPLE DIAGNOSIS / reason for visit: aortic stenosis    CARE TEAM MEMBERS    PCP : Sravan Vargas MD     PRIMARY CARDIOLOGIST: Dr. Bindu Christianson PROVIDER: Dr. Luis Manuel Blackburn:   Judie Rodriguez is a 67 y.o. male referred by Dr. Laurita Tanner for aortic stenosis. Past medical history of HTN, AS, sinus bradycardia, spinal stenosis with lumbar radiculopathy    He follows with Dr. Laurita Tanner annually for valve disease and bradycardia. Echo completed on 10/18/23 reveals EF 60-65%, moderate to severe aortic stenosis with mild AI. The valve has slightly worsened since his exam last year. He continues daily activities and is essentially asymptomatic besides LE edema and an episode of dizziness about 3 months ago (both of which have improved upon switching from amlodipine to Losartan. He denies CP, SOB, MARI, syncope or near syncope, or palpitations. He is able to go up and down the steps at his house (14 steps) without symptoms. His walking is limited by his back pain for which he gets intermittent injections. -TTE 10/18/23:   Normal left ventricle size. Mild concentric left ventricular hypertrophy. Ejection fraction is visually estimated at 60-65%. Moderate to severe aortic stenosis. COURTNEY 1.0, MG 37, Vmax 4.1, DI 0.29   Mild aortic regurgitation.       -ECG 8/31/23:  Sinus john, 50 bpm,          PAST HISTORY:   Past Medical History:   Diagnosis Date    Hoarseness     began 8/2012; no pain    Hypertension        Past Surgical History:   Procedure Laterality Date    ANESTHESIA NERVE BLOCK Right 8/8/2019    RIGHT S1 TRANSFORAMINAL EPIDURAL STEROID INJECTION performed by Rakesh Bey DO at [facility]  10/18/2012    NERVE BLOCK Right 08/08/2019

## 2023-11-30 NOTE — PATIENT INSTRUCTIONS
We will see you back with repeat echocardiogram in 6 months. Call sooner if concerns or symptoms arise in the meantime    Follow up with Drs. Hartwell Brunner as scheduled

## 2024-09-17 ENCOUNTER — OFFICE VISIT (OUTPATIENT)
Dept: PHYSICAL MEDICINE AND REHAB | Age: 74
End: 2024-09-17
Payer: MEDICARE

## 2024-09-17 VITALS
HEIGHT: 70 IN | DIASTOLIC BLOOD PRESSURE: 50 MMHG | BODY MASS INDEX: 24.34 KG/M2 | SYSTOLIC BLOOD PRESSURE: 144 MMHG | WEIGHT: 170 LBS

## 2024-09-17 DIAGNOSIS — G89.29 CHRONIC BILATERAL LOW BACK PAIN WITHOUT SCIATICA: Primary | ICD-10-CM

## 2024-09-17 DIAGNOSIS — M47.816 LUMBAR SPONDYLOSIS: ICD-10-CM

## 2024-09-17 DIAGNOSIS — M47.816 LUMBAR FACET ARTHROPATHY: ICD-10-CM

## 2024-09-17 DIAGNOSIS — M54.50 CHRONIC BILATERAL LOW BACK PAIN WITHOUT SCIATICA: Primary | ICD-10-CM

## 2024-09-17 PROCEDURE — 3077F SYST BP >= 140 MM HG: CPT | Performed by: PHYSICAL MEDICINE & REHABILITATION

## 2024-09-17 PROCEDURE — 4004F PT TOBACCO SCREEN RCVD TLK: CPT | Performed by: PHYSICAL MEDICINE & REHABILITATION

## 2024-09-17 PROCEDURE — 3078F DIAST BP <80 MM HG: CPT | Performed by: PHYSICAL MEDICINE & REHABILITATION

## 2024-09-17 PROCEDURE — G8428 CUR MEDS NOT DOCUMENT: HCPCS | Performed by: PHYSICAL MEDICINE & REHABILITATION

## 2024-09-17 PROCEDURE — 99214 OFFICE O/P EST MOD 30 MIN: CPT | Performed by: PHYSICAL MEDICINE & REHABILITATION

## 2024-09-17 PROCEDURE — 3017F COLORECTAL CA SCREEN DOC REV: CPT | Performed by: PHYSICAL MEDICINE & REHABILITATION

## 2024-09-17 PROCEDURE — 1123F ACP DISCUSS/DSCN MKR DOCD: CPT | Performed by: PHYSICAL MEDICINE & REHABILITATION

## 2024-09-17 PROCEDURE — G8420 CALC BMI NORM PARAMETERS: HCPCS | Performed by: PHYSICAL MEDICINE & REHABILITATION

## 2024-09-17 RX ORDER — FOLIC ACID 1 MG/1
1 TABLET ORAL DAILY
COMMUNITY

## 2024-09-17 RX ORDER — ASPIRIN 81 MG/1
81 TABLET ORAL DAILY
COMMUNITY

## 2024-09-17 RX ORDER — NIFEDIPINE 60 MG/1
60 TABLET, EXTENDED RELEASE ORAL DAILY
COMMUNITY

## (undated) DEVICE — NEEDLE HYPO 25GA L1.5IN BLU POLYPR HUB S STL REG BVL STR

## (undated) DEVICE — NEEDLE SPNL 22GA L5IN BLK HUB S STL W/ QNCKE PNT W/OUT

## (undated) DEVICE — 6 ML SYRINGE LUER-LOCK TIP: Brand: MONOJECT

## (undated) DEVICE — 3M™ RED DOT™ MONITORING ELECTRODE WITH FOAM TAPE AND STICKY GEL 2560, 50/BAG, 20/CASE, 72/PLT: Brand: RED DOT™

## (undated) DEVICE — NON-DEHP CATHETER EXTENSION SET, MALE LUER LOCK ADAPTER

## (undated) DEVICE — MARKER,SKIN,WI/RULER AND LABELS: Brand: MEDLINE

## (undated) DEVICE — TOWEL OR BLUEE 16X26IN ST 8 PACK ORB08 16X26ORTWL

## (undated) DEVICE — BANDAGE ADH W1XL3IN NAT FAB WVN FLX DURABLE N ADH PD SEAL

## (undated) DEVICE — GAUZE,SPONGE,4"X4",12PLY,STERILE,LF,2'S: Brand: MEDLINE

## (undated) DEVICE — Z DISCONTINUED APPLICATOR SURG PREP 0.35OZ 2% CHG 70% ISO ALC W/ HI LT

## (undated) DEVICE — NEEDLE HYPO 18GA L1.5IN PNK POLYPR HUB S STL THN WALL FILL

## (undated) DEVICE — ENCORE® LATEX TEXTURED SIZE 6.5, STERILE LATEX POWDER-FREE SURGICAL GLOVE: Brand: ENCORE

## (undated) DEVICE — NEEDLE SPNL 22GA L3.5IN BLK HUB S STL REG WALL FIT STYL W/

## (undated) DEVICE — 3 ML SYRINGE LUER-LOCK TIP: Brand: MONOJECT